# Patient Record
Sex: FEMALE | Race: WHITE | Employment: FULL TIME | URBAN - METROPOLITAN AREA
[De-identification: names, ages, dates, MRNs, and addresses within clinical notes are randomized per-mention and may not be internally consistent; named-entity substitution may affect disease eponyms.]

---

## 2017-05-24 ENCOUNTER — ANESTHESIA (OUTPATIENT)
Dept: OPERATING ROOM | Age: 42
End: 2017-05-24
Payer: COMMERCIAL

## 2017-05-24 ENCOUNTER — ANESTHESIA EVENT (OUTPATIENT)
Dept: OPERATING ROOM | Age: 42
End: 2017-05-24
Payer: COMMERCIAL

## 2017-05-24 ENCOUNTER — HOSPITAL ENCOUNTER (OUTPATIENT)
Age: 42
Setting detail: OUTPATIENT SURGERY
Discharge: HOME | End: 2017-05-24
Attending: UROLOGY | Admitting: UROLOGY
Payer: COMMERCIAL

## 2017-05-24 ENCOUNTER — APPOINTMENT (OUTPATIENT)
Dept: GENERAL RADIOLOGY | Age: 42
End: 2017-05-24
Attending: UROLOGY
Payer: COMMERCIAL

## 2017-05-24 VITALS
OXYGEN SATURATION: 98 % | HEART RATE: 93 BPM | HEIGHT: 65 IN | DIASTOLIC BLOOD PRESSURE: 80 MMHG | SYSTOLIC BLOOD PRESSURE: 130 MMHG | RESPIRATION RATE: 19 BRPM | WEIGHT: 223 LBS | TEMPERATURE: 97.5 F | BODY MASS INDEX: 37.15 KG/M2

## 2017-05-24 VITALS — DIASTOLIC BLOOD PRESSURE: 52 MMHG | SYSTOLIC BLOOD PRESSURE: 99 MMHG | OXYGEN SATURATION: 99 %

## 2017-05-24 LAB — HCG, PREGNANCY URINE (POC): NEGATIVE

## 2017-05-24 PROCEDURE — 84703 CHORIONIC GONADOTROPIN ASSAY: CPT

## 2017-05-24 PROCEDURE — 2500000003 HC RX 250 WO HCPCS: Performed by: NURSE ANESTHETIST, CERTIFIED REGISTERED

## 2017-05-24 PROCEDURE — 3600000014 HC SURGERY LEVEL 4 ADDTL 15MIN: Performed by: UROLOGY

## 2017-05-24 PROCEDURE — 7100000010 HC PHASE II RECOVERY - FIRST 15 MIN: Performed by: UROLOGY

## 2017-05-24 PROCEDURE — 7100000000 HC PACU RECOVERY - FIRST 15 MIN: Performed by: UROLOGY

## 2017-05-24 PROCEDURE — 2580000003 HC RX 258: Performed by: UROLOGY

## 2017-05-24 PROCEDURE — 6360000002 HC RX W HCPCS: Performed by: UROLOGY

## 2017-05-24 PROCEDURE — 3700000001 HC ADD 15 MINUTES (ANESTHESIA): Performed by: UROLOGY

## 2017-05-24 PROCEDURE — C1769 GUIDE WIRE: HCPCS | Performed by: UROLOGY

## 2017-05-24 PROCEDURE — 3700000000 HC ANESTHESIA ATTENDED CARE: Performed by: UROLOGY

## 2017-05-24 PROCEDURE — 6360000004 HC RX CONTRAST MEDICATION

## 2017-05-24 PROCEDURE — C2617 STENT, NON-COR, TEM W/O DEL: HCPCS | Performed by: UROLOGY

## 2017-05-24 PROCEDURE — 3600000004 HC SURGERY LEVEL 4 BASE: Performed by: UROLOGY

## 2017-05-24 PROCEDURE — 7100000001 HC PACU RECOVERY - ADDTL 15 MIN: Performed by: UROLOGY

## 2017-05-24 PROCEDURE — 87086 URINE CULTURE/COLONY COUNT: CPT

## 2017-05-24 PROCEDURE — 74000 XR ABDOMEN LIMITED (KUB): CPT

## 2017-05-24 PROCEDURE — C1758 CATHETER, URETERAL: HCPCS | Performed by: UROLOGY

## 2017-05-24 PROCEDURE — 2580000003 HC RX 258: Performed by: ANESTHESIOLOGY

## 2017-05-24 PROCEDURE — 6370000000 HC RX 637 (ALT 250 FOR IP): Performed by: UROLOGY

## 2017-05-24 PROCEDURE — 6360000002 HC RX W HCPCS: Performed by: ANESTHESIOLOGY

## 2017-05-24 PROCEDURE — 6360000002 HC RX W HCPCS: Performed by: NURSE ANESTHETIST, CERTIFIED REGISTERED

## 2017-05-24 DEVICE — URETERAL STENT
Type: IMPLANTABLE DEVICE | Site: URETER | Status: FUNCTIONAL
Brand: POLARIS™ ULTRA

## 2017-05-24 RX ORDER — DOCUSATE SODIUM 100 MG/1
100 CAPSULE, LIQUID FILLED ORAL 2 TIMES DAILY PRN
Qty: 30 CAPSULE | Refills: 1 | Status: SHIPPED | OUTPATIENT
Start: 2017-05-24 | End: 2017-10-02 | Stop reason: ALTCHOICE

## 2017-05-24 RX ORDER — ONDANSETRON 2 MG/ML
INJECTION INTRAMUSCULAR; INTRAVENOUS PRN
Status: DISCONTINUED | OUTPATIENT
Start: 2017-05-24 | End: 2017-05-24 | Stop reason: SDUPTHER

## 2017-05-24 RX ORDER — FENTANYL CITRATE 50 UG/ML
25 INJECTION, SOLUTION INTRAMUSCULAR; INTRAVENOUS EVERY 5 MIN PRN
Status: COMPLETED | OUTPATIENT
Start: 2017-05-24 | End: 2017-05-24

## 2017-05-24 RX ORDER — LIDOCAINE HYDROCHLORIDE 10 MG/ML
INJECTION, SOLUTION EPIDURAL; INFILTRATION; INTRACAUDAL; PERINEURAL PRN
Status: DISCONTINUED | OUTPATIENT
Start: 2017-05-24 | End: 2017-05-24 | Stop reason: SDUPTHER

## 2017-05-24 RX ORDER — MIDAZOLAM HYDROCHLORIDE 1 MG/ML
2 INJECTION INTRAMUSCULAR; INTRAVENOUS ONCE
Status: COMPLETED | OUTPATIENT
Start: 2017-05-24 | End: 2017-05-24

## 2017-05-24 RX ORDER — MAGNESIUM HYDROXIDE 1200 MG/15ML
LIQUID ORAL CONTINUOUS PRN
Status: DISCONTINUED | OUTPATIENT
Start: 2017-05-24 | End: 2017-05-24 | Stop reason: HOSPADM

## 2017-05-24 RX ORDER — GLYCOPYRROLATE 0.2 MG/ML
INJECTION INTRAMUSCULAR; INTRAVENOUS PRN
Status: DISCONTINUED | OUTPATIENT
Start: 2017-05-24 | End: 2017-05-24 | Stop reason: SDUPTHER

## 2017-05-24 RX ORDER — FENTANYL CITRATE 50 UG/ML
INJECTION, SOLUTION INTRAMUSCULAR; INTRAVENOUS PRN
Status: DISCONTINUED | OUTPATIENT
Start: 2017-05-24 | End: 2017-05-24 | Stop reason: SDUPTHER

## 2017-05-24 RX ORDER — ONDANSETRON 2 MG/ML
4 INJECTION INTRAMUSCULAR; INTRAVENOUS ONCE
Status: COMPLETED | OUTPATIENT
Start: 2017-05-24 | End: 2017-05-24

## 2017-05-24 RX ORDER — HYDROCODONE BITARTRATE AND ACETAMINOPHEN 5; 325 MG/1; MG/1
2 TABLET ORAL EVERY 6 HOURS PRN
COMMUNITY
End: 2017-10-02 | Stop reason: ALTCHOICE

## 2017-05-24 RX ORDER — SODIUM CHLORIDE, SODIUM LACTATE, POTASSIUM CHLORIDE, CALCIUM CHLORIDE 600; 310; 30; 20 MG/100ML; MG/100ML; MG/100ML; MG/100ML
INJECTION, SOLUTION INTRAVENOUS CONTINUOUS
Status: DISCONTINUED | OUTPATIENT
Start: 2017-05-24 | End: 2017-05-24 | Stop reason: HOSPADM

## 2017-05-24 RX ORDER — KETOROLAC TROMETHAMINE 30 MG/ML
INJECTION, SOLUTION INTRAMUSCULAR; INTRAVENOUS PRN
Status: DISCONTINUED | OUTPATIENT
Start: 2017-05-24 | End: 2017-05-24 | Stop reason: SDUPTHER

## 2017-05-24 RX ORDER — OXYBUTYNIN CHLORIDE 5 MG/1
5 TABLET ORAL 3 TIMES DAILY PRN
Qty: 30 TABLET | Refills: 1 | Status: SHIPPED | OUTPATIENT
Start: 2017-05-24 | End: 2017-10-02 | Stop reason: ALTCHOICE

## 2017-05-24 RX ORDER — ULTRASOUND COUPLING MEDIUM
GEL (GRAM) TOPICAL PRN
Status: DISCONTINUED | OUTPATIENT
Start: 2017-05-24 | End: 2017-05-24 | Stop reason: HOSPADM

## 2017-05-24 RX ORDER — DEXAMETHASONE SODIUM PHOSPHATE 10 MG/ML
INJECTION INTRAMUSCULAR; INTRAVENOUS PRN
Status: DISCONTINUED | OUTPATIENT
Start: 2017-05-24 | End: 2017-05-24 | Stop reason: SDUPTHER

## 2017-05-24 RX ORDER — TAMSULOSIN HYDROCHLORIDE 0.4 MG/1
0.4 CAPSULE ORAL DAILY
Qty: 7 CAPSULE | Refills: 1 | Status: SHIPPED | OUTPATIENT
Start: 2017-05-24 | End: 2017-10-02 | Stop reason: ALTCHOICE

## 2017-05-24 RX ORDER — PROPOFOL 10 MG/ML
INJECTION, EMULSION INTRAVENOUS PRN
Status: DISCONTINUED | OUTPATIENT
Start: 2017-05-24 | End: 2017-05-24 | Stop reason: SDUPTHER

## 2017-05-24 RX ORDER — OXYCODONE HYDROCHLORIDE AND ACETAMINOPHEN 5; 325 MG/1; MG/1
1-2 TABLET ORAL EVERY 4 HOURS PRN
Qty: 30 TABLET | Refills: 0 | Status: SHIPPED | OUTPATIENT
Start: 2017-05-24 | End: 2017-10-02 | Stop reason: ALTCHOICE

## 2017-05-24 RX ADMIN — PHENYLEPHRINE HYDROCHLORIDE 100 MCG: 10 INJECTION INTRAMUSCULAR; INTRAVENOUS; SUBCUTANEOUS at 16:52

## 2017-05-24 RX ADMIN — Medication 2 G: at 16:30

## 2017-05-24 RX ADMIN — FENTANYL CITRATE 25 MCG: 50 INJECTION, SOLUTION INTRAMUSCULAR; INTRAVENOUS at 15:53

## 2017-05-24 RX ADMIN — DEXAMETHASONE SODIUM PHOSPHATE 10 MG: 10 INJECTION INTRAMUSCULAR; INTRAVENOUS at 16:30

## 2017-05-24 RX ADMIN — LIDOCAINE HYDROCHLORIDE 50 MG: 10 INJECTION, SOLUTION EPIDURAL; INFILTRATION; INTRACAUDAL; PERINEURAL at 16:26

## 2017-05-24 RX ADMIN — SODIUM CHLORIDE, POTASSIUM CHLORIDE, SODIUM LACTATE AND CALCIUM CHLORIDE: 600; 310; 30; 20 INJECTION, SOLUTION INTRAVENOUS at 14:19

## 2017-05-24 RX ADMIN — GLYCOPYRROLATE 0.2 MG: 0.2 INJECTION INTRAMUSCULAR; INTRAVENOUS at 16:44

## 2017-05-24 RX ADMIN — KETOROLAC TROMETHAMINE 30 MG: 30 INJECTION, SOLUTION INTRAMUSCULAR; INTRAVENOUS at 16:59

## 2017-05-24 RX ADMIN — ONDANSETRON 4 MG: 2 INJECTION, SOLUTION INTRAMUSCULAR; INTRAVENOUS at 16:06

## 2017-05-24 RX ADMIN — FENTANYL CITRATE 50 MCG: 50 INJECTION INTRAMUSCULAR; INTRAVENOUS at 16:26

## 2017-05-24 RX ADMIN — FENTANYL CITRATE 25 MCG: 50 INJECTION, SOLUTION INTRAMUSCULAR; INTRAVENOUS at 16:12

## 2017-05-24 RX ADMIN — ONDANSETRON 4 MG: 2 INJECTION, SOLUTION INTRAMUSCULAR; INTRAVENOUS at 16:30

## 2017-05-24 RX ADMIN — FENTANYL CITRATE 25 MCG: 50 INJECTION, SOLUTION INTRAMUSCULAR; INTRAVENOUS at 16:07

## 2017-05-24 RX ADMIN — GLYCOPYRROLATE 0.2 MG: 0.2 INJECTION INTRAMUSCULAR; INTRAVENOUS at 16:42

## 2017-05-24 RX ADMIN — FENTANYL CITRATE 25 MCG: 50 INJECTION, SOLUTION INTRAMUSCULAR; INTRAVENOUS at 15:58

## 2017-05-24 RX ADMIN — MIDAZOLAM HYDROCHLORIDE 2 MG: 1 INJECTION, SOLUTION INTRAMUSCULAR; INTRAVENOUS at 14:27

## 2017-05-24 RX ADMIN — PROPOFOL 200 MG: 10 INJECTION, EMULSION INTRAVENOUS at 16:26

## 2017-05-24 ASSESSMENT — PAIN SCALES - GENERAL
PAINLEVEL_OUTOF10: 0
PAINLEVEL_OUTOF10: 7
PAINLEVEL_OUTOF10: 0
PAINLEVEL_OUTOF10: 0

## 2017-05-24 ASSESSMENT — ENCOUNTER SYMPTOMS: SHORTNESS OF BREATH: 0

## 2017-05-24 ASSESSMENT — PAIN - FUNCTIONAL ASSESSMENT: PAIN_FUNCTIONAL_ASSESSMENT: 0-10

## 2017-05-25 LAB
CULTURE: NO GROWTH
CULTURE: NORMAL
Lab: NORMAL
SPECIMEN DESCRIPTION: NORMAL
STATUS: NORMAL

## 2017-09-09 DIAGNOSIS — I10 UNSPECIFIED ESSENTIAL HYPERTENSION: ICD-10-CM

## 2017-10-02 ENCOUNTER — HOSPITAL ENCOUNTER (OUTPATIENT)
Age: 42
Setting detail: SPECIMEN
Discharge: HOME | End: 2017-10-02
Payer: COMMERCIAL

## 2017-10-02 DIAGNOSIS — Z83.2 FAMILY HISTORY OF FACTOR V LEIDEN MUTATION: ICD-10-CM

## 2017-10-03 LAB
FACTOR V ACTIVITY: 118 % (ref 50–150)
PROTEIN C ANTIGEN: 108 % (ref 63–153)
PROTEIN S ANTIGEN, TOTAL: 139 % (ref 63–126)

## 2017-10-05 LAB
ANTICARDIOLIPIN IGA ANTIBODY: 0.5 APU
ANTICARDIOLIPIN IGG ANTIBODY: 14.8 GPU
CARDIOLIPIN AB IGM: 6 MPU
DILUTE RUSSELL VIPER VENOM TIME: NORMAL
FACTOR V LEIDEN MUTATION: NORMAL
INR BLD: 0.9
LUPUS ANTICOAG: NORMAL
MTHFR MUTATION 677T/A1298C: NORMAL
PARTIAL THROMBOPLASTIN TIME: 24 SEC (ref 21.3–31.3)
PROTHROMBIN TIME: 9.7 SEC (ref 9.4–12.6)

## 2017-10-18 PROBLEM — Z15.89 HOMOZYGOUS MTHFR MUTATION A1298C: Status: ACTIVE | Noted: 2017-10-18

## 2017-10-23 ENCOUNTER — OFFICE VISIT (OUTPATIENT)
Dept: ORTHOPEDIC SURGERY | Age: 42
End: 2017-10-23
Payer: COMMERCIAL

## 2017-10-23 VITALS — BODY MASS INDEX: 37.15 KG/M2 | HEIGHT: 65 IN | WEIGHT: 223 LBS

## 2017-10-23 DIAGNOSIS — M79.631 RIGHT FOREARM PAIN: Primary | ICD-10-CM

## 2017-10-23 DIAGNOSIS — M25.562 LEFT KNEE PAIN, UNSPECIFIED CHRONICITY: ICD-10-CM

## 2017-10-23 PROBLEM — M79.602 LEFT ARM PAIN: Status: ACTIVE | Noted: 2017-10-23

## 2017-10-23 PROCEDURE — 99203 OFFICE O/P NEW LOW 30 MIN: CPT | Performed by: ORTHOPAEDIC SURGERY

## 2017-10-23 NOTE — PROGRESS NOTES
Subjective:      Patient ID: Yolanda Parra is a 43 y.o. female.     HPI    Review of Systems    Objective:   Physical Exam  X-rays left knee reviewed standing AP bilateral lateral left normal for age  Assessment:            Plan:
slight bit ecchymosis at the junction middle distal third of the forearm she has some slight tenderness to palpation over the volar ulnar junction middle proximal forearm FDS FDP and wrist flexors all appear to be intact    Examination left leg reveals trace if any edema really don't see much at this time at all. Patient normal strength no obvious deformity knee exam is normal  Assessment:      Encounter Diagnosis   Name Primary?     Left forearm pain Yes   Patient with likely proximal muscle pull  Knee exam is unremarkable    Patient with aggravation with using a treadmill    Recommend patient readjust her workout schedule as it appears it slightly and overuse injury      Plan:      Patient reassured    Follow-up as needed

## 2018-01-17 ENCOUNTER — OFFICE VISIT (OUTPATIENT)
Dept: OBGYN CLINIC | Age: 43
End: 2018-01-17
Payer: COMMERCIAL

## 2018-01-17 ENCOUNTER — HOSPITAL ENCOUNTER (OUTPATIENT)
Age: 43
Setting detail: SPECIMEN
Discharge: HOME | End: 2018-01-17
Payer: COMMERCIAL

## 2018-01-17 VITALS
RESPIRATION RATE: 18 BRPM | HEART RATE: 82 BPM | DIASTOLIC BLOOD PRESSURE: 86 MMHG | HEIGHT: 65 IN | BODY MASS INDEX: 37.99 KG/M2 | SYSTOLIC BLOOD PRESSURE: 150 MMHG | WEIGHT: 228 LBS

## 2018-01-17 DIAGNOSIS — Z01.411 ENCOUNTER FOR GYNECOLOGICAL EXAMINATION (GENERAL) (ROUTINE) WITH ABNORMAL FINDINGS: ICD-10-CM

## 2018-01-17 DIAGNOSIS — N92.1 MENORRHAGIA WITH IRREGULAR CYCLE: ICD-10-CM

## 2018-01-17 DIAGNOSIS — Z11.51 SPECIAL SCREENING EXAMINATION FOR HUMAN PAPILLOMAVIRUS (HPV): ICD-10-CM

## 2018-01-17 DIAGNOSIS — Z01.419 WELL FEMALE EXAM WITH ROUTINE GYNECOLOGICAL EXAM: Primary | ICD-10-CM

## 2018-01-17 PROCEDURE — 99396 PREV VISIT EST AGE 40-64: CPT | Performed by: ADVANCED PRACTICE MIDWIFE

## 2018-01-17 PROCEDURE — G0145 SCR C/V CYTO,THINLAYER,RESCR: HCPCS

## 2018-01-17 PROCEDURE — 87624 HPV HI-RISK TYP POOLED RSLT: CPT

## 2018-01-17 ASSESSMENT — PATIENT HEALTH QUESTIONNAIRE - PHQ9
SUM OF ALL RESPONSES TO PHQ9 QUESTIONS 1 & 2: 0
SUM OF ALL RESPONSES TO PHQ QUESTIONS 1-9: 0
1. LITTLE INTEREST OR PLEASURE IN DOING THINGS: 0
2. FEELING DOWN, DEPRESSED OR HOPELESS: 0

## 2018-01-17 NOTE — PROGRESS NOTES
ureteroscopy, stent, holmium laser    FOOT SURGERY Right 2010    HEMORRHOID SURGERY  2015    removal    TONSILLECTOMY       Family History   Problem Relation Age of Onset    Diabetes Father     High Blood Pressure Father     Heart Disease Father     Diabetes Sister     Diabetes Brother     Diabetes Daughter      Social History     Social History    Marital status: Single     Spouse name: N/A    Number of children: N/A    Years of education: N/A     Occupational History    Not on file. Social History Main Topics    Smoking status: Current Every Day Smoker     Types: Cigarettes    Smokeless tobacco: Never Used    Alcohol use Yes      Comment: occasssional    Drug use: No    Sexual activity: Yes     Partners: Male     Other Topics Concern    Not on file     Social History Narrative    No narrative on file       MEDICATIONS:  Current Outpatient Prescriptions   Medication Sig Dispense Refill    metoprolol tartrate (LOPRESSOR) 25 MG tablet take 2 tablets by mouth in the morning and 1 tablet in the evening 270 tablet 3    liraglutide-weight management (SAXENDA) 18 MG/3ML SOPN Inject 3mg daily 1 Pen 5    SUMAtriptan (IMITREX) 100 MG tablet Take 1 tablet by mouth once as needed for Migraine 9 tablet 3     No current facility-administered medications for this visit. ALLERGIES:  Allergies as of 01/17/2018 - Review Complete 01/17/2018   Allergen Reaction Noted    Percocet [oxycodone-acetaminophen] Itching and Anxiety 10/02/2017       Symptoms of decreased mood absent    **If either question is answered in a  positive fashion then complete the PHQ9 Scoring Evaluation and make the appropriate referral**      Immunization status: up to date and documented, stated as current, but no records available. Gynecologic History:  Menarche: 15 yo  Menopause at  yo     Patient's last menstrual period was 12/26/2017.     Sexually Active: Yes    STD History: No     Permanent Sterilization: No Tobacco & Secondary smoke risks reviewed; instructed on cessation and avoidance      Counseling Completed:  Preventative Health Recommendations and Follow up. The patient was informed of the recommended preventative health recommendations. 1. Annuals every year; Cytology collections per prevailing guidelines. 2. Mammograms begin every year at 35 yo if no abnormalities are found and no family     History. 3. Bone density studies every 2-3 years. Begin at 71 yo. If no fracture history or osteoporosis family history. (significant). 4. Colonoscopy begin at 49 yo. Repeat every ten years if negative and no family history. 5. Calcium of 2322-6379 mg/day in split dosing  6. Vitamin D 400-800 IU/day  7. All other preventative health recommendations will be managed by the patients Primary care physician. PLAN:  Return in about 3 weeks (around 2/7/2018) for Dr Nehemiah Kang to discuss possible ablation . Pelvic ultrasound ordered, CBC, TSH with reflex, Quant BHCG, PT, PTT ordered call for results  FSH, estradiol   Written information on ablation given  Repeat Annual every 1 year  Cervical Cytology Evaluation begins at 24years old. If Negative Cytology, Follow-up screening per current guidelines. Mammograms every 1 year. If 35 yo and last mammogram was negative. Calcium and Vitamin D dosing reviewed. Colonoscopy screening reviewed as well as onset for bone density testing. Birth control and barrier recommendations discussed. STD counseling and prevention reviewed. Gardisil counseling completed for all patients 7-35 yo. Routine health maintenance per patients PCP. Orders Placed This Encounter   Procedures    US Non OB Transvaginal     Begin with transabdominal imaging.      Standing Status:   Future     Standing Expiration Date:   1/17/2019     Order Specific Question:   Reason for exam:     Answer:   abnormal bleeding    PAP SMEAR     Patient History:    Patient's last menstrual period was

## 2018-01-18 ENCOUNTER — HOSPITAL ENCOUNTER (OUTPATIENT)
Dept: ULTRASOUND IMAGING | Age: 43
Discharge: HOME | End: 2018-01-18
Payer: COMMERCIAL

## 2018-01-18 DIAGNOSIS — N92.1 MENORRHAGIA WITH IRREGULAR CYCLE: ICD-10-CM

## 2018-01-18 PROCEDURE — 76856 US EXAM PELVIC COMPLETE: CPT

## 2018-01-18 PROCEDURE — 76830 TRANSVAGINAL US NON-OB: CPT

## 2018-01-19 ENCOUNTER — HOSPITAL ENCOUNTER (OUTPATIENT)
Age: 43
Discharge: HOME | End: 2018-01-19
Payer: COMMERCIAL

## 2018-01-19 DIAGNOSIS — N92.1 MENORRHAGIA WITH IRREGULAR CYCLE: ICD-10-CM

## 2018-01-19 LAB
ABSOLUTE EOS #: 0.1 K/UL (ref 0–0.4)
ABSOLUTE IMMATURE GRANULOCYTE: ABNORMAL K/UL (ref 0–0.3)
ABSOLUTE LYMPH #: 2.8 K/UL (ref 1–4.8)
ABSOLUTE MONO #: 0.5 K/UL (ref 0.1–1.3)
BASOPHILS # BLD: 1 % (ref 0–2)
BASOPHILS ABSOLUTE: 0 K/UL (ref 0–0.2)
DIFFERENTIAL TYPE: ABNORMAL
EOSINOPHILS RELATIVE PERCENT: 1 % (ref 0–4)
ESTRADIOL LEVEL: 81 PG/ML (ref 27–314)
FOLLICLE STIMULATING HORMONE: 2 U/L (ref 1.7–21.5)
HCG QUANTITATIVE: <1 IU/L
HCT VFR BLD CALC: 44.9 % (ref 36–46)
HEMOGLOBIN: 14.6 G/DL (ref 12–16)
HPV SAMPLE: NORMAL
HPV SOURCE: NORMAL
HPV, GENOTYPE 16: NOT DETECTED
HPV, GENOTYPE 18: NOT DETECTED
HPV, HIGH RISK OTHER: NOT DETECTED
HPV, INTERPRETATION: NORMAL
IMMATURE GRANULOCYTES: ABNORMAL %
INR BLD: 1
LYMPHOCYTES # BLD: 36 % (ref 24–44)
MCH RBC QN AUTO: 28 PG (ref 26–34)
MCHC RBC AUTO-ENTMCNC: 32.5 G/DL (ref 31–37)
MCV RBC AUTO: 86.3 FL (ref 80–100)
MONOCYTES # BLD: 7 % (ref 1–7)
NRBC AUTOMATED: ABNORMAL PER 100 WBC
PARTIAL THROMBOPLASTIN TIME: 27.5 SEC (ref 23–31)
PDW BLD-RTO: 13.5 % (ref 11.5–14.9)
PLATELET # BLD: 258 K/UL (ref 150–450)
PLATELET ESTIMATE: ABNORMAL
PMV BLD AUTO: 9.1 FL (ref 6–12)
PROTHROMBIN TIME: 10 SEC (ref 9.7–12)
RBC # BLD: 5.21 M/UL (ref 4–5.2)
RBC # BLD: ABNORMAL 10*6/UL
SEG NEUTROPHILS: 55 % (ref 36–66)
SEGMENTED NEUTROPHILS ABSOLUTE COUNT: 4.3 K/UL (ref 1.3–9.1)
TSH SERPL DL<=0.05 MIU/L-ACNC: 0.83 MIU/L (ref 0.3–5)
WBC # BLD: 7.7 K/UL (ref 3.5–11)
WBC # BLD: ABNORMAL 10*3/UL

## 2018-01-19 PROCEDURE — 84702 CHORIONIC GONADOTROPIN TEST: CPT

## 2018-01-19 PROCEDURE — 36415 COLL VENOUS BLD VENIPUNCTURE: CPT

## 2018-01-19 PROCEDURE — 82670 ASSAY OF TOTAL ESTRADIOL: CPT

## 2018-01-19 PROCEDURE — 84443 ASSAY THYROID STIM HORMONE: CPT

## 2018-01-19 PROCEDURE — 85610 PROTHROMBIN TIME: CPT

## 2018-01-19 PROCEDURE — 85025 COMPLETE CBC W/AUTO DIFF WBC: CPT

## 2018-01-19 PROCEDURE — 83001 ASSAY OF GONADOTROPIN (FSH): CPT

## 2018-01-19 PROCEDURE — 85730 THROMBOPLASTIN TIME PARTIAL: CPT

## 2018-01-26 LAB — CYTOLOGY REPORT: NORMAL

## 2018-04-17 ENCOUNTER — OFFICE VISIT (OUTPATIENT)
Dept: OBGYN CLINIC | Age: 43
End: 2018-04-17
Payer: COMMERCIAL

## 2018-04-17 VITALS
HEIGHT: 65 IN | DIASTOLIC BLOOD PRESSURE: 72 MMHG | BODY MASS INDEX: 35.82 KG/M2 | SYSTOLIC BLOOD PRESSURE: 122 MMHG | WEIGHT: 215 LBS | HEART RATE: 78 BPM

## 2018-04-17 DIAGNOSIS — N94.6 DYSMENORRHEA: ICD-10-CM

## 2018-04-17 DIAGNOSIS — N92.1 MENORRHAGIA WITH IRREGULAR CYCLE: Primary | ICD-10-CM

## 2018-04-17 PROCEDURE — 99213 OFFICE O/P EST LOW 20 MIN: CPT | Performed by: OBSTETRICS & GYNECOLOGY

## 2018-05-02 ENCOUNTER — TELEPHONE (OUTPATIENT)
Dept: OBGYN CLINIC | Age: 43
End: 2018-05-02

## 2018-10-15 PROBLEM — R05.3 CHRONIC COUGH: Status: ACTIVE | Noted: 2018-10-15

## 2018-11-19 ENCOUNTER — HOSPITAL ENCOUNTER (OUTPATIENT)
Dept: SLEEP CENTER | Age: 43
Discharge: HOME | End: 2018-11-21
Payer: COMMERCIAL

## 2018-11-19 DIAGNOSIS — R53.82 CHRONIC FATIGUE: ICD-10-CM

## 2018-11-19 PROCEDURE — 95810 POLYSOM 6/> YRS 4/> PARAM: CPT

## 2018-11-20 VITALS
HEIGHT: 65 IN | RESPIRATION RATE: 16 BRPM | OXYGEN SATURATION: 96 % | DIASTOLIC BLOOD PRESSURE: 88 MMHG | BODY MASS INDEX: 39.65 KG/M2 | WEIGHT: 238 LBS | SYSTOLIC BLOOD PRESSURE: 136 MMHG | HEART RATE: 67 BPM

## 2018-11-20 ASSESSMENT — SLEEP AND FATIGUE QUESTIONNAIRES
HOW LIKELY ARE YOU TO NOD OFF OR FALL ASLEEP WHILE SITTING AND TALKING TO SOMEONE: 0
HOW LIKELY ARE YOU TO NOD OFF OR FALL ASLEEP WHEN YOU ARE A PASSENGER IN A CAR FOR AN HOUR WITHOUT A BREAK: 3
HOW LIKELY ARE YOU TO NOD OFF OR FALL ASLEEP WHILE WATCHING TV: 3
HOW LIKELY ARE YOU TO NOD OFF OR FALL ASLEEP WHEN YOU ARE A PASSENGER IN A CAR FOR AN HOUR WITHOUT A BREAK: 3
HOW LIKELY ARE YOU TO NOD OFF OR FALL ASLEEP WHILE SITTING QUIETLY AFTER LUNCH WITHOUT ALCOHOL: 3
HOW LIKELY ARE YOU TO NOD OFF OR FALL ASLEEP WHILE SITTING QUIETLY AFTER LUNCH WITHOUT ALCOHOL: 3
HOW LIKELY ARE YOU TO NOD OFF OR FALL ASLEEP WHILE SITTING AND READING: 3
HOW LIKELY ARE YOU TO NOD OFF OR FALL ASLEEP WHILE SITTING INACTIVE IN A PUBLIC PLACE: 3
HOW LIKELY ARE YOU TO NOD OFF OR FALL ASLEEP IN A CAR, WHILE STOPPED FOR A FEW MINUTES IN TRAFFIC: 0
HOW LIKELY ARE YOU TO NOD OFF OR FALL ASLEEP WHILE SITTING AND TALKING TO SOMEONE: 0
ESS TOTAL SCORE: 18
HOW LIKELY ARE YOU TO NOD OFF OR FALL ASLEEP IN A CAR, WHILE STOPPED FOR A FEW MINUTES IN TRAFFIC: 0
HOW LIKELY ARE YOU TO NOD OFF OR FALL ASLEEP WHILE LYING DOWN TO REST IN THE AFTERNOON WHEN CIRCUMSTANCES PERMIT: 3
HOW LIKELY ARE YOU TO NOD OFF OR FALL ASLEEP WHILE SITTING AND READING: 3
ESS TOTAL SCORE: 18
HOW LIKELY ARE YOU TO NOD OFF OR FALL ASLEEP WHILE WATCHING TV: 3
HOW LIKELY ARE YOU TO NOD OFF OR FALL ASLEEP WHILE LYING DOWN TO REST IN THE AFTERNOON WHEN CIRCUMSTANCES PERMIT: 3
HOW LIKELY ARE YOU TO NOD OFF OR FALL ASLEEP WHILE SITTING INACTIVE IN A PUBLIC PLACE: 3

## 2018-12-01 LAB — STATUS: NORMAL

## 2019-02-08 ENCOUNTER — OFFICE VISIT (OUTPATIENT)
Dept: OBGYN CLINIC | Age: 44
End: 2019-02-08
Payer: COMMERCIAL

## 2019-02-08 ENCOUNTER — HOSPITAL ENCOUNTER (OUTPATIENT)
Age: 44
Setting detail: SPECIMEN
Discharge: HOME | End: 2019-02-08
Payer: COMMERCIAL

## 2019-02-08 VITALS
BODY MASS INDEX: 38.65 KG/M2 | HEIGHT: 65 IN | HEART RATE: 78 BPM | WEIGHT: 232 LBS | SYSTOLIC BLOOD PRESSURE: 122 MMHG | DIASTOLIC BLOOD PRESSURE: 84 MMHG

## 2019-02-08 DIAGNOSIS — Z01.419 PAP SMEAR, AS PART OF ROUTINE GYNECOLOGICAL EXAMINATION: Primary | ICD-10-CM

## 2019-02-08 DIAGNOSIS — Z11.51 SCREENING FOR HUMAN PAPILLOMAVIRUS: ICD-10-CM

## 2019-02-08 DIAGNOSIS — N94.6 DYSMENORRHEA: ICD-10-CM

## 2019-02-08 DIAGNOSIS — Z98.890 HX OF ABDOMINOPLASTY: ICD-10-CM

## 2019-02-08 DIAGNOSIS — N92.0 MENORRHAGIA WITH REGULAR CYCLE: ICD-10-CM

## 2019-02-08 DIAGNOSIS — Z01.419 PAP SMEAR, AS PART OF ROUTINE GYNECOLOGICAL EXAMINATION: ICD-10-CM

## 2019-02-08 PROCEDURE — 87624 HPV HI-RISK TYP POOLED RSLT: CPT

## 2019-02-08 PROCEDURE — 99396 PREV VISIT EST AGE 40-64: CPT | Performed by: NURSE PRACTITIONER

## 2019-02-08 PROCEDURE — G0145 SCR C/V CYTO,THINLAYER,RESCR: HCPCS

## 2019-02-08 ASSESSMENT — PATIENT HEALTH QUESTIONNAIRE - PHQ9
2. FEELING DOWN, DEPRESSED OR HOPELESS: 0
SUM OF ALL RESPONSES TO PHQ QUESTIONS 1-9: 0
1. LITTLE INTEREST OR PLEASURE IN DOING THINGS: 0
SUM OF ALL RESPONSES TO PHQ9 QUESTIONS 1 & 2: 0
SUM OF ALL RESPONSES TO PHQ QUESTIONS 1-9: 0

## 2019-02-11 LAB
COMMENT: NORMAL
HPV SAMPLE: ABNORMAL
HPV, GENOTYPE 16: NOT DETECTED
HPV, GENOTYPE 18: NOT DETECTED
HPV, HIGH RISK OTHER: DETECTED
HPV, INTERPRETATION: ABNORMAL
SPECIMEN DESCRIPTION: ABNORMAL

## 2019-02-13 ENCOUNTER — OFFICE VISIT (OUTPATIENT)
Dept: OBGYN CLINIC | Age: 44
End: 2019-02-13

## 2019-02-13 DIAGNOSIS — N94.6 DYSMENORRHEA: ICD-10-CM

## 2019-02-13 DIAGNOSIS — N92.0 MENORRHAGIA WITH REGULAR CYCLE: Primary | ICD-10-CM

## 2019-02-13 DIAGNOSIS — N92.0 MENORRHAGIA WITH REGULAR CYCLE: ICD-10-CM

## 2019-02-13 PROCEDURE — 76830 TRANSVAGINAL US NON-OB: CPT | Performed by: OBSTETRICS & GYNECOLOGY

## 2019-02-13 PROCEDURE — 76856 US EXAM PELVIC COMPLETE: CPT | Performed by: OBSTETRICS & GYNECOLOGY

## 2019-02-19 ENCOUNTER — TELEPHONE (OUTPATIENT)
Dept: OBGYN CLINIC | Age: 44
End: 2019-02-19

## 2019-02-22 ENCOUNTER — TELEPHONE (OUTPATIENT)
Dept: OBGYN CLINIC | Age: 44
End: 2019-02-22

## 2019-02-22 LAB — CYTOLOGY REPORT: NORMAL

## 2019-02-27 ENCOUNTER — PROCEDURE VISIT (OUTPATIENT)
Dept: OBGYN CLINIC | Age: 44
End: 2019-02-27
Payer: COMMERCIAL

## 2019-02-27 ENCOUNTER — HOSPITAL ENCOUNTER (OUTPATIENT)
Age: 44
Setting detail: SPECIMEN
Discharge: HOME | End: 2019-02-27
Payer: COMMERCIAL

## 2019-02-27 VITALS
HEIGHT: 65 IN | SYSTOLIC BLOOD PRESSURE: 128 MMHG | BODY MASS INDEX: 37.65 KG/M2 | RESPIRATION RATE: 18 BRPM | HEART RATE: 88 BPM | DIASTOLIC BLOOD PRESSURE: 84 MMHG | WEIGHT: 226 LBS

## 2019-02-27 DIAGNOSIS — R87.810 ASCUS WITH POSITIVE HIGH RISK HPV CERVICAL: Primary | ICD-10-CM

## 2019-02-27 DIAGNOSIS — N94.6 DYSMENORRHEA: ICD-10-CM

## 2019-02-27 DIAGNOSIS — N92.1 MENORRHAGIA WITH IRREGULAR CYCLE: ICD-10-CM

## 2019-02-27 DIAGNOSIS — Z32.02 NEGATIVE PREGNANCY TEST: ICD-10-CM

## 2019-02-27 DIAGNOSIS — Z83.3 FAMILY HISTORY OF DIABETES MELLITUS IN FATHER: ICD-10-CM

## 2019-02-27 DIAGNOSIS — N85.2 BULKY OR ENLARGED UTERUS: ICD-10-CM

## 2019-02-27 DIAGNOSIS — N92.0 MENORRHAGIA WITH REGULAR CYCLE: ICD-10-CM

## 2019-02-27 DIAGNOSIS — Z98.891 HX OF CESAREAN SECTION: ICD-10-CM

## 2019-02-27 DIAGNOSIS — R87.610 ASCUS WITH POSITIVE HIGH RISK HPV CERVICAL: Primary | ICD-10-CM

## 2019-02-27 LAB
CONTROL: NORMAL
PREGNANCY TEST URINE, POC: NEGATIVE

## 2019-02-27 PROCEDURE — 88305 TISSUE EXAM BY PATHOLOGIST: CPT

## 2019-02-27 PROCEDURE — 57454 BX/CURETT OF CERVIX W/SCOPE: CPT | Performed by: OBSTETRICS & GYNECOLOGY

## 2019-02-27 PROCEDURE — 81025 URINE PREGNANCY TEST: CPT | Performed by: OBSTETRICS & GYNECOLOGY

## 2019-03-03 LAB — SURGICAL PATHOLOGY REPORT: NORMAL

## 2019-04-11 ENCOUNTER — HOSPITAL ENCOUNTER (OUTPATIENT)
Age: 44
Setting detail: SPECIMEN
Discharge: HOME | End: 2019-04-11
Payer: COMMERCIAL

## 2019-04-11 ENCOUNTER — PROCEDURE VISIT (OUTPATIENT)
Dept: OBGYN CLINIC | Age: 44
End: 2019-04-11
Payer: COMMERCIAL

## 2019-04-11 VITALS
DIASTOLIC BLOOD PRESSURE: 90 MMHG | HEIGHT: 65 IN | SYSTOLIC BLOOD PRESSURE: 124 MMHG | HEART RATE: 78 BPM | BODY MASS INDEX: 37.65 KG/M2 | WEIGHT: 226 LBS

## 2019-04-11 DIAGNOSIS — Z32.02 NEGATIVE PREGNANCY TEST: ICD-10-CM

## 2019-04-11 DIAGNOSIS — N94.6 DYSMENORRHEA: Primary | ICD-10-CM

## 2019-04-11 DIAGNOSIS — N92.0 MENORRHAGIA WITH REGULAR CYCLE: ICD-10-CM

## 2019-04-11 DIAGNOSIS — N85.2 ENLARGED UTERUS: ICD-10-CM

## 2019-04-11 LAB
CONTROL: NORMAL
PREGNANCY TEST URINE, POC: NEGATIVE

## 2019-04-11 PROCEDURE — 88305 TISSUE EXAM BY PATHOLOGIST: CPT

## 2019-04-11 PROCEDURE — 81025 URINE PREGNANCY TEST: CPT | Performed by: OBSTETRICS & GYNECOLOGY

## 2019-04-11 PROCEDURE — 58558 HYSTEROSCOPY BIOPSY: CPT | Performed by: OBSTETRICS & GYNECOLOGY

## 2019-04-11 RX ORDER — BUPROPION HYDROCHLORIDE 150 MG/1
TABLET ORAL
Refills: 5 | COMMUNITY
Start: 2019-03-27 | End: 2019-09-26

## 2019-04-11 NOTE — PROGRESS NOTES
[facility]  Flexible-3mm  Hysteroscopy Procedure Note      Marge Tees  2019  Patient's last menstrual period was 2019. Chief Complaint   Patient presents with    Procedure   [id number] 1-c/S LTC and  x 2      Blood pressure (!) 124/90, pulse 78, height 5' 5\" (1.651 m), weight 226 lb (102.5 kg), last menstrual period 2019, not currently breastfeeding. UltraSound Findings:             UPT: negative  Cytology Report 2019  9:[address]   [address]   ANATOMIC PATHOLOGY   [address] [place] [address] Drive, [address]. [place], [address]   [phone]   Fax: [phone]   GYNECOLOGIC CYTOLOGY REPORT     Patient Name: Eduardo Sawant   MR#: [id number]   Specimen #[id number]   Source:   1: Cervical material, (ThinPrep vial, Imaging-assisted review)     Clinical History   Z01.419 Routine gyn exam without abnormal findings   Co-Test:  ThinPrep Pap with high risk HPV testing     INTERPRETATION     Cervical material, (ThinPrep vial, Imaging-assisted review):   Specimen Adequacy:        Satisfactory for evaluation.        -Endocervical/transformation zone component is absent. Descriptive Diagnosis:        Atypical squamous cells of undetermined significance (ASC-US).  Comments:        High Risk HPV testing was ordered. Cytotechnologist:   ANDREZ Donovan M.D.   **Electronically Signed Out**         sls/2019           Procedure/Addendum   HPV Procedure Report     Date Ordered:     2019     Status:   Signed Out        Date Complete:     2019     By: PABLITO Lora(ASCP)        Date Reported:     2019       INTERPRETATION   Roche HPV DNA High Risk                                                         HPV Sample               Thin Prep                    (Ref Range)   HPV Type 16               Not Detected                    (Not   Detected)   HPV Type 18               Not Detected                    (Not   Detected)   Other High Risk HPV     Detected                    (Not Detected)        This test amplifies and detects DNA of 14 high-risk HPV types   associated with cervical cancer and its precursor lesions (HPV types   16, 18, 31, 33, 35, 39, 45, 51, 52, 56, 58, 59, 66, and 68). Sensitivity may be affected by specimen collection methods, stage of   infection, and the presence of interfering substances.  Results should   be interpreted in conjunction with other available laboratory and   clinical data.  A negative high-risk HPV result does not exclude the   possibility of future cytologic HSIL or underlying CIN2-3 or cancer. This test is intended for medical purposes only and is not valid for   the evaluation of suspected sexual abuse or for other forensic   purposes.          Surgical Pathology Report 02/27/2019 12:41 PM [facility]   [address] [facility] [address] [place] [address]   [phone]   Fax: [phone]   SURGICAL PATHOLOGY REPORT     Patient Name: Jerald Martell   MR#: [id number]   Specimen #[id number]         Final Diagnosis   CERVIX, ENDOCERVICAL CURETTAGE:          FRAGMENTS OF ENDOCERVICAL MUCOSAL AND ECTOCERVICAL EPITHELIUM   WITH ACUTE AND CHRONIC INFLAMMATION AND REACTIVE EPITHELIAL CELL   CHANGES     NEGATIVE FOR DYSPLASIA OR HUMAN PAPILLOMA VIRUS CYTOPATHIC CHANGES     Diagnosis Comment   Colposcopy with biopsy is recommended if clinically indicated. Emy Painter M.D.   **Electronically Signed Out**         tc/3/3/2019     Clinical Information   ASCUS with positive high risk HPV cervical     Source:   A: Endocervical curettage     Gross Description   Received in formalin, in a container, labelled with the patient's   name, identifiers and \"ECC\" is an aggregate (2 x 1.5 x 0.2 cm) of tan   hemorrhagic mucinous material.  The specimen is wrapped in lens paper   and entirely submitted in one cassette. cervical lip. The uterus was sounded to 10 cm. An Endo See  was then placed thru the endocervical canal and into the endometrial cavity without any complications. A total of 30 ml of normal saline was instilled to aid in visualization of the endometrial anatomy, video was not completed. During the hysteroscopic procedure an endometrial sampling was performed without incident. Pathology is pending. The patient tolerated the procedure well, the Allis was removed off the anterior cervical lip and there was noted to be adequate hemostasis. The patient was given restrictions and will follow-up in the office in 10-14 days. She will report any abdominal pain, heavy vaginal bleeding or a temperature of greater than 100.4. Hysteroscopic Findings:  No septums polyps or fibroids    Assessment:   Diagnosis Orders   1. Dysmenorrhea  HI HYSTEROSCOPY,W/ENDO BX    Specimen to Pathology Outpatient   2. Menorrhagia with regular cycle  HI HYSTEROSCOPY,W/ENDO BX    Specimen to Pathology Outpatient   3. Enlarged uterus  HI HYSTEROSCOPY,W/ENDO BX    Specimen to Pathology Outpatient   4.  Negative pregnancy test  POCT urine pregnancy     Patient Active Problem List    Diagnosis Date Noted    Dysmenorrhea 2019     Priority: High    Menorrhagia with regular cycle 10/15/2018     Priority: High    Essential hypertension 2014     Priority: High    Hx of  section x 1 LTC 2019     Priority: Medium    Hx of abdominoplasty 2019     Priority: Medium    Smoker 10/15/2018     Priority: Medium    [id number]2017     Priority: Medium    Family history of factor V Leiden mutation (daughter) Pt is negative 10/02/2017     Priority: Medium    Chronic cough 10/15/2018    Chronic fatigue 10/15/2018    Microscopic hematuria 2018    Acute pain of right knee 10/23/2017    Right forearm pain 10/23/2017    History of migraine headaches     Mixed hyperlipidemia     Class 2 severe obesity due to excess calories with serious comorbidity and body mass index (BMI) of 39.0 to 39.9 in adult [facility]) 05/27/2015           Recommendations:  Return to the office for follow-up in 2 weeks to review the pathology of the biopsy and for further recommendations. Patient is considering Medically indicated tubal removal and endometrial ablation with hysteroscopy via Novasure . Pt counseling completed:  Risk reduction Counseling for Primary Peritoneal/Ovarian Cancer: The patient was counseled on the risk factors that make her above the average risk for development of primary peritoneal/ovarian cancer. The patient was also counseled on the options of completing Risk Reduction Surgery with her upcoming pelvic gynecologic or abdominal surgery. The procedure with its associated risks, benefits, and alternatives were reviewed at length. Per the the recommendations from the Society of Gynecologic Oncology and the [employer] of Obstetricians and Gynecologists, the patient had the procedure reviewed and was informed of the potential benefits of such a procedure. The primary benefit is a greater than 50% reduction in the future risk of development Primary Peritoneal/Ovarian cancer. She was informed that large scale studies have not shown an increase in the estimated blood loss, complications, or operating time. The patient was made aware that bleeding, infection, and injury to nearby organs are potential complications with this additional surgery. The patient was also informed and had this reviewed in detail with her that once the risk reduction procedure is completed she will have lost the fertility opportunity, to conceive naturally, going forward and that any future conception would require reproductive assisted approaches; (IVF, GIFT,ZIFT)-all described in lay terms to the patient. The patient was counseled and understands that the loss of fertility can not be reversed.   She voiced understanding of this and signed a written consent. She was also counseled that any future pregnancy (18- cases annually), carries an added increase risk of ectopic pregnancy and the potential need for future surgery. The patient still wishes to proceed with the risk reduction surgery. The patient had explained to her that the completion of the procedure does not guarantee her that she will not be diagnosed with a future primary peritoneal or ovarian malignancy but her risks are greatly reduced. The patient had the procedure discussed with her at length and in detail. The risks and benefits of concurrent ovarian cancer risk reducing bilateral salpingectomy , (Removal of the fallopian tubes), with the patient's upcoming scheduled endometrial ablation. The patient was counseled on the medically indicated need to restrict a future pregnancy after an endometrial ablation. She was counseled on the increased risk of having an accreta, increta, or percreta, (in Lay terms), with any future pregnancy and the risks each of those carry with them. She is aware after the counseling that if one of the previously listed situations occurred that there would be a high probability that she would need a blood transfusion and/or a   hysterectomy and even with those interventions maternal death is 50% with the percreta. The patient was offered a medically indicated tubal removal, Risk Reduction Procedure. This would entail removing of the fallopian tubes including the fimbriae. She was informed this would require three incisions that were small and where they would be located on her abdomen. She was also given the information of a tubal interruption, clipping or burning of the fallopian tubes. She was made aware that this requires two incisions that were the same size as the procedure to remove her fallopian tubes. She was counseled on the alternate location of those incisions.  She was given the information of Risk Reduction from future primary peritoneal Carcinoma being upwards of 65% with removal of the fallopian tubes and 45-50% with interruption of the fallopian tubes. Finally, the patient has been thoroughly counseled regarding the consequence of loss of fertility following this procedure. The patient understands that this loss of fertility cannot be reversed and has expressed via verbal and written consent that her wishes are to proceed with this surgery for the purposes of reducing risk for ovarian cancer. The patient is requesting to have her fallopian tubes Removed; triple incision procedure .         Ashwin Tom DO

## 2019-04-16 LAB — SURGICAL PATHOLOGY REPORT: NORMAL

## 2019-04-30 ENCOUNTER — OFFICE VISIT (OUTPATIENT)
Dept: OBGYN CLINIC | Age: 44
End: 2019-04-30
Payer: COMMERCIAL

## 2019-04-30 VITALS
BODY MASS INDEX: 37.99 KG/M2 | HEIGHT: 65 IN | SYSTOLIC BLOOD PRESSURE: 138 MMHG | DIASTOLIC BLOOD PRESSURE: 84 MMHG | HEART RATE: 80 BPM | WEIGHT: 228 LBS

## 2019-04-30 DIAGNOSIS — Z98.891 HX OF CESAREAN SECTION: ICD-10-CM

## 2019-04-30 DIAGNOSIS — N85.2 ENLARGED UTERUS: ICD-10-CM

## 2019-04-30 DIAGNOSIS — N92.0 MENORRHAGIA WITH REGULAR CYCLE: ICD-10-CM

## 2019-04-30 DIAGNOSIS — Z98.890 HX OF ABDOMINOPLASTY: ICD-10-CM

## 2019-04-30 DIAGNOSIS — N94.6 DYSMENORRHEA: Primary | ICD-10-CM

## 2019-04-30 PROCEDURE — 99213 OFFICE O/P EST LOW 20 MIN: CPT | Performed by: OBSTETRICS & GYNECOLOGY

## 2019-04-30 NOTE — H&P (VIEW-ONLY)
[address] [facility]  [facility]; [address]  [place], [address] [place] [address]  [phone] mn [phone] Fax        Li Courtney  1975  Primary Care Physician: ANGELA Hui 224: [facility]      2019  MEDICAID CONSENT COMPLETED: No      HPI: Li Courtney is a 37 y.o. female   Pt with abnormal heavy painful menses. She states they are affecting ADL's. Her  has had a vasectomy but pt wishes the meduically indicated tubal interruption. She initially wanted the tubes removed but due to her tattoo now wishes interruption with Filshie clip application. She had an abnormal pap and completed colposcopy with ECC. She completed an endometrial sampling. She states the symptoms are affecting both parental and work ADL's. She has failed NSAIDs, Depo Provera, OCP's. She wishes conservative surgical management. She is aware of medical and surgical alternative options as reviewed with her. She is demanding her fallopian tubes be interrupted eventhough her  has had a vasectomy RB reviewed.     Relevant Past History:   Patient Active Problem List    Diagnosis Date Noted    Dysmenorrhea 2019     Priority: High    Menorrhagia with regular cycle 10/15/2018     Priority: High    Essential hypertension 2014     Priority: High    Hx of  section x 1 LTC 2019     Priority: Medium    Hx of abdominoplasty 2019     Priority: Medium    Smoker 10/15/2018     Priority: Medium    [facility] B[facility]111I [facility] 10/18/2017     Priority: Medium    Family history of factor V Leiden mutation (daughter) Pt is negative 10/02/2017     Priority: Medium    Chronic cough 10/15/2018    Chronic fatigue 10/15/2018    Microscopic hematuria 2018    Acute pain of right knee 10/23/2017    Right forearm pain 10/23/2017    History of migraine headaches     Mixed hyperlipidemia     Class 2 severe obesity due to excess calories with serious comorbidity and body mass index (BMI) of 39.0 to 39.9 in adult ([facility] 2015         OB History    Para Term  AB Living   3 3 3 0 0 3   SAB TAB Ectopic Molar Multiple Live Births   0 0 0 0 0 3      # Outcome Date GA Lbr Thomas/2nd Weight Sex Delivery Anes PTL Lv   3 Term 2003    M Vag-Spont   JOCELINE   2 Term 1997    F Vag-Spont  N JOCELINE   1 Term 12    F CS-LVertical  N JOCELINE       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 ([facility] 2018       Past Surgical History:   Procedure Laterality Date    ABDOMEN SURGERY      tummy tuck    BACK SURGERY      BREAST SURGERY      lift     SECTION      COLPOSCOPY  2019    CYSTO/URETERO/PYELOSCOPY, CALCULUS TX N/A 2017    CYSTOSCOPY LEFT URETEROSCOPY HOLMIUM LASER, LITHOTRIPSY, LEFT STENT PLACEMENT performed by Geneva Bailey MD at [address] [place] [address]  2017    ureteroscopy, stent, holmium laser    FOOT SURGERY Right 2010    HEMORRHOID SURGERY  2015    removal    TONSILLECTOMY         Social History     Socioeconomic History    Marital status: Single     Spouse name: Not on file    Number of children: Not on file    Years of education: Not on file    Highest education level: Not on file   Occupational History    Not on file   Social Needs    Financial resource strain: Not on file    Food insecurity:     Worry: Not on file     Inability: Not on file    Transportation needs:     Medical: Not on file     Non-medical: Not on file   Tobacco Use    Smoking status: Current Every Day Smoker     Types: Cigarettes    Smokeless tobacco: Never Used   Substance and Sexual Activity    Alcohol use: Yes     Comment: occasssional    Drug use: No    Sexual activity: Yes     Partners: Male   Lifestyle    Physical activity:     Days per week: Not on file Minutes per session: Not on file    Stress: Not on file   Relationships    Social connections:     Talks on phone: Not on file     Gets together: Not on file     Attends Zoroastrianism service: Not on file     Active member of club or organization: Not on file     Attends meetings of clubs or organizations: Not on file     Relationship status: Not on file    Intimate partner violence:     Fear of current or ex partner: Not on file     Emotionally abused: Not on file     Physically abused: Not on file     Forced sexual activity: Not on file   Other Topics Concern    Not on file   Social History Narrative    Not on file       Psychosocial History: Stable    MEDICATIONS:  Current Outpatient Medications   Medication Sig Dispense Refill    buPROPion (WELLBUTRIN XL) 150 MG extended release tablet TAKE 1 TABLET BY MOUTH IN THE MORNING  5    metoprolol tartrate (LOPRESSOR) 25 MG tablet TAKE 2 TABLETS BY MOUTH IN THE MORNING and 1 tablet in the evening 90 tablet 1    loratadine (CLARITIN) 10 MG tablet Take 1 tablet by mouth daily as needed (cough, nasal symptoms) 30 tablet 2    SUMAtriptan (IMITREX) 100 MG tablet Take 1 tablet by mouth once as needed for Migraine 9 tablet 3     No current facility-administered medications for this visit. ALLERGIES:  Allergies as of 04/30/2019 - Review Complete 04/30/2019   Allergen Reaction Noted    Percocet [oxycodone-acetaminophen] Itching and Anxiety 10/02/2017           REVIEW OF SYSTEMS:      General appearance:Appears healthy. Alert; in no acute distress. Pleasant. HEENT: Glasses or contacts no  CARDIOVASCULAR: Denies: chest pain, dyspnea on exertion, palpitations  RESPIRATORY: Denies: cough, shortness of breath, wheezing  GI: Denies: abdominal pain, flank pain, nausea, vomiting, diarrhea  : Denies: dysuria, frequency/urgency, hematuria, pelvic pain  MUSCULOSKELETAL: Denies: back pain, joint swelling, muscle pain  SKIN: Denies: rash, hives.   HEMATOLOGIC: Denies Bleeding Disorder, Coagulopathy or Transfusion  NEUROLOGIC: Denies Migraines, Headaches, CVA, TIA  ENDOCRINE: Denies any Endocrinologic disorders                PHYSICAL EXAM:  Blood pressure 138/84, pulse 80, height 5' 5\" (1.651 m), weight 228 lb (103.4 kg), last menstrual period 04/29/2019, not currently breastfeeding. General Appearance:  awake, alert, oriented, in no acute distress, well developed, well nourished, in no acute distress   Skin:  Skin color, texture, turgor normal. No rashes or lesions  Mouth/Throat:  Mucosa moist.  No lesions. Pharynx without erythema, edema or exudate. Lungs:  Normal expansion. Clear to auscultation. No rales, rhonchi, or wheezing. Heart:  Heart sounds are normal.  Regular rate and rhythm without murmur, gallop or rub. Breast:  Inspection negative. No nipple discharge or bleeding. No palpable mass  Abdomen:  Soft, non-tender, normal bowel sounds. No bruits, organomegaly or masses. Extremities: Extremities warm to touch, pink, with no edema. Musculoskeletal:  negative  Peripheral Pulses:  Normal  Neurologic:  Gait normal. Reflexes normal and symmetric. Sensation grossly intact. Female Urogen:  Declined  Female Rectal: Declined    OMM Structural Component:  The patient did not complain of a Chief complaint requiring OMM. Chief Complaint:none    Structural Exam: No Interest              Diagnostics:          Lab Results:  Results for orders placed or performed during the hospital encounter of 04/11/19   Surgical Pathology   Result Value Ref Range    Surgical Pathology Report       [address] [place] [address]. [address]  [phone]  Fax: [phone]  SURGICAL PATHOLOGY REPORT    Patient Name: Can Kearns  MR#: [id number]  Specimen #[id number]       Final Diagnosis  ENDOMETRIUM, BIOPSY:         PROLIFERATIVE-TYPE ENDOMETRIUM      Oscar Worley.  Kay Goldman D.O.  **Electronically Signed Out**         [facility]/4/16/2019    Clinical Detected                    (Not Detected)        This test amplifies and detects DNA of 14 high-risk HPV types   associated with cervical cancer and its precursor lesions (HPV types   16, 18, 31, 33, 35, 39, 45, 51, 52, 56, 58, 59, 66, and 68). Sensitivity may be affected by specimen collection methods, stage of   infection, and the presence of interfering substances.  Results should   be interpreted in conjunction with other available laboratory and   clinical data.  A negative high-risk HPV result does not exclude the   possibility of future cytologic HSIL or underlying CIN2-3 or cancer. This test is intended for medical purposes only and is not valid for   the evaluation of suspected sexual abuse or for other forensic   purposes.           Surgical Pathology Report 02/27/2019 12:41 PM [facility]   [address] [facility]. [address]   [phone]   Fax: [phone]   SURGICAL PATHOLOGY REPORT     Patient Name: Tracy Chris   MR#: [id number]   Specimen #[id number]         Final Diagnosis   CERVIX, ENDOCERVICAL CURETTAGE:          FRAGMENTS OF ENDOCERVICAL MUCOSAL AND ECTOCERVICAL EPITHELIUM   WITH ACUTE AND CHRONIC INFLAMMATION AND REACTIVE EPITHELIAL CELL   CHANGES     NEGATIVE FOR DYSPLASIA OR HUMAN PAPILLOMA VIRUS CYTOPATHIC CHANGES     Diagnosis Comment   Colposcopy with biopsy is recommended if clinically indicated. Marilu Reynoso M.D.   **Electronically Signed Out**         tc/3/3/2019     Clinical Information   ASCUS with positive high risk HPV cervical     Source:   A: Endocervical curettage     Gross Description   Received in formalin, in a container, labelled with the patient's   name, identifiers and \"ECC\" is an aggregate (2 x 1.5 x 0.2 cm) of tan   hemorrhagic mucinous material.  The specimen is wrapped in lens paper   and entirely submitted in one cassette.      Microscopic Description   Two slides with H&E stained material are examined.  Microscopic   examination confirms the final pathologic diagnosis.       Colposcopy Completed         Counseling: The patient had the procedure discussed with her at length and in detail. The risks and benefits of concurrent ovarian cancer risk reducing bilateral salpingectomy , (Removal of the fallopian tubes), with the patient's upcoming scheduled endometrial ablation. The patient was counseled on the medically indicated need to restrict a future pregnancy after an endometrial ablation. She was counseled on the increased risk of having an accreta, increta, or percreta, (in Lay terms), with any future pregnancy and the risks each of those carry with them. She is aware after the counseling that if one of the previously listed situations occurred that there would be a high probability that she would need a blood transfusion and/or a   hysterectomy and even with those interventions maternal death is 50% with the percreta. The patient was offered a medically indicated tubal removal, Risk Reduction Procedure. This would entail removing of the fallopian tubes including the fimbriae. She was informed this would require three incisions that were small and where they would be located on her abdomen. She was also given the information of a tubal interruption, clipping or burning of the fallopian tubes. She was made aware that this requires two incisions that were the same size as the procedure to remove her fallopian tubes. She was counseled on the alternate location of those incisions. She was given the information of Risk Reduction from future primary peritoneal Carcinoma being upwards of 65% with removal of the fallopian tubes and 45-50% with interruption of the fallopian tubes. Finally, the patient has been thoroughly counseled regarding the consequence of loss of fertility following this procedure.  The patient understands that this loss of fertility cannot be reversed and has expressed via verbal and written consent that her wishes are to proceed with this surgery for the purposes of reducing risk for ovarian cancer. The patient is requesting to have her fallopian tubes Interrupted with Filshie clips due to her tattoo . Risk reduction Counseling for Primary Peritoneal/Ovarian Cancer: The patient was counseled on the risk factors that make her above the average risk for development of primary peritoneal/ovarian cancer. The patient was also counseled on the options of completing Risk Reduction Surgery with her upcoming pelvic gynecologic or abdominal surgery. The procedure with its associated risks, benefits, and alternatives were reviewed at length. Per the the recommendations from the Society of Gynecologic Oncology and the [address] [place] [address] of Obstetricians and Gynecologists, the patient had the procedure reviewed and was informed of the potential benefits of such a procedure. The primary benefit is a greater than 50% reduction in the future risk of development Primary Peritoneal/Ovarian cancer. She was informed that large scale studies have not shown an increase in the estimated blood loss, complications, or operating time. The patient was made aware that bleeding, infection, and injury to nearby organs are potential complications with this additional surgery. The patient was also informed and had this reviewed in detail with her that once the risk reduction procedure is completed she will have lost the fertility opportunity, to conceive naturally, going forward and that any future conception would require reproductive assisted approaches; (IVF, GIFT,ZIFT)-all described in lay terms to the patient. The patient was counseled and understands that the loss of fertility can not be reversed. She voiced understanding of this and signed a written consent.  She was also counseled that any future pregnancy (18-20/1000 cases annually), carries an added increase risk of ectopic pregnancy and the potential need for future surgery. The patient still wishes to proceed with the risk reduction surgery. The patient had explained to her that the completion of the procedure does not guarantee her that she will not be diagnosed with a future primary peritoneal or ovarian malignancy but her risks are greatly reduced. Assessment:     Diagnosis Orders   1. Dysmenorrhea     2. Menorrhagia with regular cycle     3. Enlarged uterus     4. Hx of  section     5. 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 [facility] 10/18/2017     Priority: Medium    Family history of factor V Leiden mutation (daughter) Pt is negative 10/02/2017     Priority: Medium    Chronic cough 10/15/2018    Chronic fatigue 10/15/2018    Microscopic hematuria 2018    Acute pain of right knee 10/23/2017    Right forearm pain 10/23/2017    History of migraine headaches     Mixed hyperlipidemia     Class 2 severe obesity due to excess calories with serious comorbidity and body mass index (BMI) of 39.0 to 39.9 in adult ([facility] 2015             Plan:  1. Laparoscopic tubal interruption (Medically Indicated)-Filshie clips  2. Hysteroscopic Novasure Endometrial Ablation    No orders of the defined types were placed in this encounter. Counseling: The patient was counseled on all options both medical and surgical, conservative as well as definitive. She has elected to proceed with the procedure as stated above. The patient was counseled on the procedure.  Risks and complications were reviewed in detail. The patients orders, labs, consents have been completed. The history and physical as well as all supporting surgical documentation will be forwarded to the pre-operative holding area. The patient is aware that this procedure may not alleviate her symptoms. That there may be a necessity for a second surgery and that there may be an incomplete removal of abnormal tissue. The patients that are undergoing a procedure for family planning WERE INSTRUCTED THAT THE PROCEDURE IS PERMANENT AND NON REVERSIBLE. FAILURE RATES OF 18-20/1000 CASES WERE REVIEWED AS WELL AS ALL ALTERNATE REVERSIBLE FORMS OF BIRTH CONTROL MALE AND FEMALE. THEY WERE COUNSELED THAT A FAILURE WOULD MOST LIKELY END UP IN AN ECTOPIC PREGNANCY, A PREGNANCY OUTSIDE OF THE UTERUS MOST COMMONLY IN THE FALLOPIAN TUBE, NECESSITATING FURTHER SURGERY, A BLOOD TRANSFUSION OR BOTH. POST TUBAL STERILIZATION SYNDROME WAS ALSO DISCUSSED WITH THE PATIENT AT LENGTH.              ________________________________________D. O.  Date:_______________  Devonda Olszewski, DO

## 2019-04-30 NOTE — PROGRESS NOTES
[address] [facility]ne[facility]  [facility]; [address]  [place], [address] [place] [address]  [phone] mn [phone] Fax        Jo Cristina  1975  Primary Care Physician: ANGELA Bradley 224: [facility]      2019  MEDICAID CONSENT COMPLETED: No      HPI: Jo Cristina is a 37 y.o. female   Pt with abnormal heavy painful menses. She states they are affecting ADL's. Her  has had a vasectomy but pt wishes the meduically indicated tubal interruption. She initially wanted the tubes removed but due to her tattoo now wishes interruption with Filshie clip application. She had an abnormal pap and completed colposcopy with ECC. She completed an endometrial sampling. She states the symptoms are affecting both parental and work ADL's. She has failed NSAIDs, Depo Provera, OCP's. She wishes conservative surgical management. She is aware of medical and surgical alternative options as reviewed with her. She is demanding her fallopian tubes be interrupted eventhough her  has had a vasectomy RB reviewed.     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 ([facility] 10/18/2017     Priority: Medium    Family history of factor V Leiden mutation (daughter) Pt is negative 10/02/2017     Priority: Medium    Chronic cough 10/15/2018    Chronic fatigue 10/15/2018    Microscopic hematuria 2018    Acute pain of right knee 10/23/2017    Right forearm pain 10/23/2017    History of migraine headaches     Mixed hyperlipidemia     Class 2 severe obesity due to Minutes per session: Not on file    Stress: Not on file   Relationships    Social connections:     Talks on phone: Not on file     Gets together: Not on file     Attends Yazidi service: Not on file     Active member of club or organization: Not on file     Attends meetings of clubs or organizations: Not on file     Relationship status: Not on file    Intimate partner violence:     Fear of current or ex partner: Not on file     Emotionally abused: Not on file     Physically abused: Not on file     Forced sexual activity: Not on file   Other Topics Concern    Not on file   Social History Narrative    Not on file       Psychosocial History: Stable    MEDICATIONS:  Current Outpatient Medications   Medication Sig Dispense Refill    buPROPion (WELLBUTRIN XL) 150 MG extended release tablet TAKE 1 TABLET BY MOUTH IN THE MORNING  5    metoprolol tartrate (LOPRESSOR) 25 MG tablet TAKE 2 TABLETS BY MOUTH IN THE MORNING and 1 tablet in the evening 90 tablet 1    loratadine (CLARITIN) 10 MG tablet Take 1 tablet by mouth daily as needed (cough, nasal symptoms) 30 tablet 2    SUMAtriptan (IMITREX) 100 MG tablet Take 1 tablet by mouth once as needed for Migraine 9 tablet 3     No current facility-administered medications for this visit. ALLERGIES:  Allergies as of 04/30/2019 - Review Complete 04/30/2019   Allergen Reaction Noted    Percocet [oxycodone-acetaminophen] Itching and Anxiety 10/02/2017           REVIEW OF SYSTEMS:      General appearance:Appears healthy. Alert; in no acute distress. Pleasant. HEENT: Glasses or contacts no  CARDIOVASCULAR: Denies: chest pain, dyspnea on exertion, palpitations  RESPIRATORY: Denies: cough, shortness of breath, wheezing  GI: Denies: abdominal pain, flank pain, nausea, vomiting, diarrhea  : Denies: dysuria, frequency/urgency, hematuria, pelvic pain  MUSCULOSKELETAL: Denies: back pain, joint swelling, muscle pain  SKIN: Denies: rash, hives.   HEMATOLOGIC: Denies Bleeding Disorder, Coagulopathy or Transfusion  NEUROLOGIC: Denies Migraines, Headaches, CVA, TIA  ENDOCRINE: Denies any Endocrinologic disorders                PHYSICAL EXAM:  Blood pressure 138/84, pulse 80, height 5' 5\" (1.651 m), weight 228 lb (103.4 kg), last menstrual period 04/29/2019, not currently breastfeeding. General Appearance:  awake, alert, oriented, in no acute distress, well developed, well nourished, in no acute distress   Skin:  Skin color, texture, turgor normal. No rashes or lesions  Mouth/Throat:  Mucosa moist.  No lesions. Pharynx without erythema, edema or exudate. Lungs:  Normal expansion. Clear to auscultation. No rales, rhonchi, or wheezing. Heart:  Heart sounds are normal.  Regular rate and rhythm without murmur, gallop or rub. Breast:  Inspection negative. No nipple discharge or bleeding. No palpable mass  Abdomen:  Soft, non-tender, normal bowel sounds. No bruits, organomegaly or masses. Extremities: Extremities warm to touch, pink, with no edema. Musculoskeletal:  negative  Peripheral Pulses:  Normal  Neurologic:  Gait normal. Reflexes normal and symmetric. Sensation grossly intact. Female Urogen:  Declined  Female Rectal: Declined    OMM Structural Component:  The patient did not complain of a Chief complaint requiring OMM. Chief Complaint:none    Structural Exam: No Interest              Diagnostics:          Lab Results:  Results for orders placed or performed during the hospital encounter of 04/11/19   Surgical Pathology   Result Value Ref Range    Surgical Pathology Report       [address]  [address] [facility]. [place], [address] [place] [address]  [phone]  Fax: [phone]  SURGICAL PATHOLOGY REPORT    Patient Name: Liane Beckford  MR#: [id number]  Specimen #[id number]       Final Diagnosis  ENDOMETRIUM, BIOPSY:         PROLIFERATIVE-TYPE ENDOMETRIUM      Tiffani Pelletier.  Nikia Montenegro D.O.  **Electronically Signed Out**         clj/4/16/2019    Clinical Information  Endometrial biopsy No formalin time on surgical slip. Source:  A: Endometrial biopsy    Gross Description  Received in formalin labeled \"EMB\" are portions of pink soft tissue  measuring 2 x 2 x 0.3 cm. The specimen is entirely submitted in a  single cassette. Microscopic Description  Microscopic examination of 2 H&E slides confirms the diagnosis. Cytology Report 02/08/2019  9:[address]  [address]   [address] [facility]   ANATOMIC PATHOLOGY   [address] [place] [address],  [address]. [place], [address]   [phone]   Fax: [phone]   GYNECOLOGIC CYTOLOGY REPORT     Patient Name: Princess Saunders   MR#: [id number]   Specimen #[id number]   Source:   1: Cervical material, (ThinPrep vial, Imaging-assisted review)     Clinical History   Z01.419 Routine gyn exam without abnormal findings   Co-Test:  ThinPrep Pap with high risk HPV testing     INTERPRETATION     Cervical material, (ThinPrep vial, Imaging-assisted review):   Specimen Adequacy:        Satisfactory for evaluation.        -Endocervical/transformation zone component is absent. Descriptive Diagnosis:        Atypical squamous cells of undetermined significance (ASC-US).  Comments:        High Risk HPV testing was ordered. Cytotechnologist:   ANDREZ Thakur M.D.   **Electronically Signed Out**         sls/2/22/2019           Procedure/Addendum   HPV Procedure Report     Date Ordered:     2/11/2019     Status:   Signed Out        Date Complete:     2/11/2019     By: PABLITO De Los Santos(ASCP)        Date Reported:     2/25/2019       INTERPRETATION   Roche HPV DNA High Risk                                                         HPV Sample               Thin Prep                    (Ref Range)   HPV Type 16               Not Detected                    (Not   Detected)   HPV Type 18               Not Detected                    (Not   Detected)   Other High Risk HPV     Detected                    (Not Detected)        This test amplifies and detects DNA of 14 high-risk HPV types   associated with cervical cancer and its precursor lesions (HPV types   16, 18, 31, 33, 35, 39, 45, 51, 52, 56, 58, 59, 66, and 68). Sensitivity may be affected by specimen collection methods, stage of   infection, and the presence of interfering substances.  Results should   be interpreted in conjunction with other available laboratory and   clinical data.  A negative high-risk HPV result does not exclude the   possibility of future cytologic HSIL or underlying CIN2-3 or cancer. This test is intended for medical purposes only and is not valid for   the evaluation of suspected sexual abuse or for other forensic   purposes.           Surgical Pathology Report 02/27/2019 12:41 PM [facility]   [address] [facility]. [address]   [phone]   Fax: [phone]   SURGICAL PATHOLOGY REPORT     Patient Name: Tracy Chris   MR#: [id number]   Specimen #[id number]         Final Diagnosis   CERVIX, ENDOCERVICAL CURETTAGE:          FRAGMENTS OF ENDOCERVICAL MUCOSAL AND ECTOCERVICAL EPITHELIUM   WITH ACUTE AND CHRONIC INFLAMMATION AND REACTIVE EPITHELIAL CELL   CHANGES     NEGATIVE FOR DYSPLASIA OR HUMAN PAPILLOMA VIRUS CYTOPATHIC CHANGES     Diagnosis Comment   Colposcopy with biopsy is recommended if clinically indicated. Marilu Reynoso M.D.   **Electronically Signed Out**         tc/3/3/2019     Clinical Information   ASCUS with positive high risk HPV cervical     Source:   A: Endocervical curettage     Gross Description   Received in formalin, in a container, labelled with the patient's   name, identifiers and \"ECC\" is an aggregate (2 x 1.5 x 0.2 cm) of tan   hemorrhagic mucinous material.  The specimen is wrapped in lens paper   and entirely submitted in one cassette.      Microscopic Description   Two slides with H&E stained material are examined.  Microscopic   examination confirms the final pathologic diagnosis.       Colposcopy Completed         Counseling: The patient had the procedure discussed with her at length and in detail. The risks and benefits of concurrent ovarian cancer risk reducing bilateral salpingectomy , (Removal of the fallopian tubes), with the patient's upcoming scheduled endometrial ablation. The patient was counseled on the medically indicated need to restrict a future pregnancy after an endometrial ablation. She was counseled on the increased risk of having an accreta, increta, or percreta, (in Lay terms), with any future pregnancy and the risks each of those carry with them. She is aware after the counseling that if one of the previously listed situations occurred that there would be a high probability that she would need a blood transfusion and/or a   hysterectomy and even with those interventions maternal death is 50% with the percreta. The patient was offered a medically indicated tubal removal, Risk Reduction Procedure. This would entail removing of the fallopian tubes including the fimbriae. She was informed this would require three incisions that were small and where they would be located on her abdomen. She was also given the information of a tubal interruption, clipping or burning of the fallopian tubes. She was made aware that this requires two incisions that were the same size as the procedure to remove her fallopian tubes. She was counseled on the alternate location of those incisions. She was given the information of Risk Reduction from future primary peritoneal Carcinoma being upwards of 65% with removal of the fallopian tubes and 45-50% with interruption of the fallopian tubes. Finally, the patient has been thoroughly counseled regarding the consequence of loss of fertility following this procedure.  The patient understands that this loss of fertility cannot be reversed and has expressed via verbal and written consent that her wishes are to proceed with this surgery for the purposes of reducing risk for ovarian cancer. The patient is requesting to have her fallopian tubes Interrupted with Filshie clips due to her tattoo . Risk reduction Counseling for Primary Peritoneal/Ovarian Cancer: The patient was counseled on the risk factors that make her above the average risk for development of primary peritoneal/ovarian cancer. The patient was also counseled on the options of completing Risk Reduction Surgery with her upcoming pelvic gynecologic or abdominal surgery. The procedure with its associated risks, benefits, and alternatives were reviewed at length. Per the the recommendations from the Society of Gynecologic Oncology and the [employer] of Obstetricians and Gynecologists, the patient had the procedure reviewed and was informed of the potential benefits of such a procedure. The primary benefit is a greater than 50% reduction in the future risk of development Primary Peritoneal/Ovarian cancer. She was informed that large scale studies have not shown an increase in the estimated blood loss, complications, or operating time. The patient was made aware that bleeding, infection, and injury to nearby organs are potential complications with this additional surgery. The patient was also informed and had this reviewed in detail with her that once the risk reduction procedure is completed she will have lost the fertility opportunity, to conceive naturally, going forward and that any future conception would require reproductive assisted approaches; (IVF, GIFT,ZIFT)-all described in lay terms to the patient. The patient was counseled and understands that the loss of fertility can not be reversed. She voiced understanding of this and signed a written consent.  She was also counseled that any future pregnancy (18-20/1000 cases annually), carries an added increase risk of ectopic pregnancy and the potential need for future surgery. The patient still wishes to proceed with the risk reduction surgery. The patient had explained to her that the completion of the procedure does not guarantee her that she will not be diagnosed with a future primary peritoneal or ovarian malignancy but her risks are greatly reduced. Assessment:     Diagnosis Orders   1. Dysmenorrhea     2. Menorrhagia with regular cycle     3. Enlarged uterus     4. Hx of  section     5. Hx of abdominoplasty         Patient Active Problem List    Diagnosis Date Noted    Dysmenorrhea 2019     Priority: High    Menorrhagia with regular cycle 10/15/2018     Priority: High    Essential hypertension 2014     Priority: High    Hx of  section x 1 LTC 2019     Priority: Medium    Hx of abdominoplasty 2019     Priority: Medium    Smoker 10/15/2018     Priority: Medium    [facility]FR [id number] [facility] 10/18/2017     Priority: Medium    Family history of factor V Leiden mutation (daughter) Pt is negative 10/02/2017     Priority: Medium    Chronic cough 10/15/2018    Chronic fatigue 10/15/2018    Microscopic hematuria 2018    Acute pain of right knee 10/23/2017    Right forearm pain 10/23/2017    History of migraine headaches     Mixed hyperlipidemia     Class 2 severe obesity due to excess calories with serious comorbidity and body mass index (BMI) of 39.0 to 39.9 in adult ([facility] 2015             Plan:  1. Laparoscopic tubal interruption (Medically Indicated)-Filshie clips  2. Hysteroscopic Novasure Endometrial Ablation    No orders of the defined types were placed in this encounter. Counseling: The patient was counseled on all options both medical and surgical, conservative as well as definitive. She has elected to proceed with the procedure as stated above. The patient was counseled on the procedure.  Risks and complications were reviewed in detail. The patients orders, labs, consents have been completed. The history and physical as well as all supporting surgical documentation will be forwarded to the pre-operative holding area. The patient is aware that this procedure may not alleviate her symptoms. That there may be a necessity for a second surgery and that there may be an incomplete removal of abnormal tissue. The patients that are undergoing a procedure for family planning WERE INSTRUCTED THAT THE PROCEDURE IS PERMANENT AND NON REVERSIBLE. FAILURE RATES OF 18- CASES WERE REVIEWED AS WELL AS ALL ALTERNATE REVERSIBLE FORMS OF BIRTH CONTROL MALE AND FEMALE. THEY WERE COUNSELED THAT A FAILURE WOULD MOST LIKELY END UP IN AN ECTOPIC PREGNANCY, A PREGNANCY OUTSIDE OF THE UTERUS MOST COMMONLY IN THE FALLOPIAN TUBE, NECESSITATING FURTHER SURGERY, A BLOOD TRANSFUSION OR BOTH. POST TUBAL STERILIZATION SYNDROME WAS ALSO DISCUSSED WITH THE PATIENT AT LENGTH.              ________________________________________D. O. Date:_______________  Nigel Fournier DO        Patient was seen with total face to face time of 15 minutes. More than 50% of this visit was on counseling and education regarding her    Diagnosis Orders   1. Dysmenorrhea     2. Menorrhagia with regular cycle     3. Enlarged uterus     4. Hx of  section     5. Hx of abdominoplasty      and her options. She was also counseled on her preventative health maintenance recommendations and follow-up.

## 2019-05-08 ENCOUNTER — HOSPITAL ENCOUNTER (OUTPATIENT)
Dept: GENERAL RADIOLOGY | Age: 44
Discharge: HOME | End: 2019-05-10
Payer: COMMERCIAL

## 2019-05-08 ENCOUNTER — HOSPITAL ENCOUNTER (OUTPATIENT)
Dept: PREADMISSION TESTING | Age: 44
Discharge: HOME | End: 2019-05-12
Payer: COMMERCIAL

## 2019-05-08 VITALS
SYSTOLIC BLOOD PRESSURE: 145 MMHG | DIASTOLIC BLOOD PRESSURE: 85 MMHG | RESPIRATION RATE: 18 BRPM | HEIGHT: 65 IN | OXYGEN SATURATION: 98 % | BODY MASS INDEX: 37.54 KG/M2 | WEIGHT: 225.3 LBS | TEMPERATURE: 97.5 F | HEART RATE: 64 BPM

## 2019-05-08 DIAGNOSIS — Z01.818 PREOP TESTING: ICD-10-CM

## 2019-05-08 LAB
ABO/RH: NORMAL
ABSOLUTE EOS #: 0 K/UL (ref 0–0.4)
ABSOLUTE IMMATURE GRANULOCYTE: ABNORMAL K/UL (ref 0–0.3)
ABSOLUTE LYMPH #: 1.8 K/UL (ref 1–4.8)
ABSOLUTE MONO #: 0.4 K/UL (ref 0.1–1.3)
ANION GAP SERPL CALCULATED.3IONS-SCNC: 11 MMOL/L (ref 9–17)
ANTIBODY SCREEN: NEGATIVE
ARM BAND NUMBER: NORMAL
BASOPHILS # BLD: 1 % (ref 0–2)
BASOPHILS ABSOLUTE: 0 K/UL (ref 0–0.2)
BILIRUBIN URINE: NEGATIVE
BLOOD BANK COMMENT: NORMAL
BUN BLDV-MCNC: 21 MG/DL (ref 6–20)
BUN/CREAT BLD: ABNORMAL (ref 9–20)
CALCIUM SERPL-MCNC: 9.9 MG/DL (ref 8.6–10.4)
CHLORIDE BLD-SCNC: 102 MMOL/L (ref 98–107)
CO2: 26 MMOL/L (ref 20–31)
COLOR: YELLOW
COMMENT UA: NORMAL
CREAT SERPL-MCNC: 0.86 MG/DL (ref 0.5–0.9)
DIFFERENTIAL TYPE: ABNORMAL
EOSINOPHILS RELATIVE PERCENT: 0 % (ref 0–4)
EXPIRATION DATE: NORMAL
GFR AFRICAN AMERICAN: >60 ML/MIN
GFR NON-AFRICAN AMERICAN: >60 ML/MIN
GFR SERPL CREATININE-BSD FRML MDRD: ABNORMAL ML/MIN/{1.73_M2}
GFR SERPL CREATININE-BSD FRML MDRD: ABNORMAL ML/MIN/{1.73_M2}
GLUCOSE BLD-MCNC: 100 MG/DL (ref 70–99)
GLUCOSE URINE: NEGATIVE
HCG QUANTITATIVE: <1 IU/L
HCT VFR BLD CALC: 43.8 % (ref 36–46)
HEMOGLOBIN: 15 G/DL (ref 12–16)
IMMATURE GRANULOCYTES: ABNORMAL %
INR BLD: 0.9
KETONES, URINE: NEGATIVE
LEUKOCYTE ESTERASE, URINE: NEGATIVE
LYMPHOCYTES # BLD: 26 % (ref 24–44)
MCH RBC QN AUTO: 29.8 PG (ref 26–34)
MCHC RBC AUTO-ENTMCNC: 34.3 G/DL (ref 31–37)
MCV RBC AUTO: 86.9 FL (ref 80–100)
MONOCYTES # BLD: 6 % (ref 1–7)
NITRITE, URINE: NEGATIVE
NRBC AUTOMATED: ABNORMAL PER 100 WBC
PARTIAL THROMBOPLASTIN TIME: 25.7 SEC (ref 24–36)
PDW BLD-RTO: 13.2 % (ref 11.5–14.9)
PH UA: 6 (ref 5–8)
PLATELET # BLD: 298 K/UL (ref 150–450)
PLATELET ESTIMATE: ABNORMAL
PMV BLD AUTO: 9.1 FL (ref 6–12)
POTASSIUM SERPL-SCNC: 4.6 MMOL/L (ref 3.7–5.3)
PROTEIN UA: NEGATIVE
PROTHROMBIN TIME: 12.5 SEC (ref 11.8–14.6)
RBC # BLD: 5.04 M/UL (ref 4–5.2)
RBC # BLD: ABNORMAL 10*6/UL
SEG NEUTROPHILS: 67 % (ref 36–66)
SEGMENTED NEUTROPHILS ABSOLUTE COUNT: 4.4 K/UL (ref 1.3–9.1)
SODIUM BLD-SCNC: 139 MMOL/L (ref 135–144)
SPECIFIC GRAVITY UA: 1.02 (ref 1–1.03)
TURBIDITY: CLEAR
URINE HGB: NEGATIVE
UROBILINOGEN, URINE: NORMAL
WBC # BLD: 6.7 K/UL (ref 3.5–11)
WBC # BLD: ABNORMAL 10*3/UL

## 2019-05-08 PROCEDURE — 71046 X-RAY EXAM CHEST 2 VIEWS: CPT

## 2019-05-08 PROCEDURE — 85610 PROTHROMBIN TIME: CPT

## 2019-05-08 PROCEDURE — 85730 THROMBOPLASTIN TIME PARTIAL: CPT

## 2019-05-08 PROCEDURE — 81003 URINALYSIS AUTO W/O SCOPE: CPT

## 2019-05-08 PROCEDURE — 36415 COLL VENOUS BLD VENIPUNCTURE: CPT

## 2019-05-08 PROCEDURE — 86900 BLOOD TYPING SEROLOGIC ABO: CPT

## 2019-05-08 PROCEDURE — 93005 ELECTROCARDIOGRAM TRACING: CPT

## 2019-05-08 PROCEDURE — 84702 CHORIONIC GONADOTROPIN TEST: CPT

## 2019-05-08 PROCEDURE — 87086 URINE CULTURE/COLONY COUNT: CPT

## 2019-05-08 PROCEDURE — 85025 COMPLETE CBC W/AUTO DIFF WBC: CPT

## 2019-05-08 PROCEDURE — 86850 RBC ANTIBODY SCREEN: CPT

## 2019-05-08 PROCEDURE — 86901 BLOOD TYPING SEROLOGIC RH(D): CPT

## 2019-05-08 PROCEDURE — 80048 BASIC METABOLIC PNL TOTAL CA: CPT

## 2019-05-09 ENCOUNTER — ANESTHESIA EVENT (OUTPATIENT)
Dept: OPERATING ROOM | Age: 44
End: 2019-05-09
Payer: COMMERCIAL

## 2019-05-09 LAB
CULTURE: NORMAL
Lab: NORMAL
SPECIMEN DESCRIPTION: NORMAL

## 2019-05-09 RX ORDER — LIDOCAINE HYDROCHLORIDE 10 MG/ML
1 INJECTION, SOLUTION EPIDURAL; INFILTRATION; INTRACAUDAL; PERINEURAL
Status: CANCELLED | OUTPATIENT
Start: 2019-05-09 | End: 2019-05-09

## 2019-05-09 RX ORDER — SODIUM CHLORIDE 0.9 % (FLUSH) 0.9 %
10 SYRINGE (ML) INJECTION PRN
Status: CANCELLED | OUTPATIENT
Start: 2019-05-09

## 2019-05-09 RX ORDER — SODIUM CHLORIDE 0.9 % (FLUSH) 0.9 %
10 SYRINGE (ML) INJECTION EVERY 12 HOURS SCHEDULED
Status: CANCELLED | OUTPATIENT
Start: 2019-05-09

## 2019-05-09 RX ORDER — SODIUM CHLORIDE, SODIUM LACTATE, POTASSIUM CHLORIDE, CALCIUM CHLORIDE 600; 310; 30; 20 MG/100ML; MG/100ML; MG/100ML; MG/100ML
INJECTION, SOLUTION INTRAVENOUS CONTINUOUS
Status: CANCELLED | OUTPATIENT
Start: 2019-05-09

## 2019-05-15 ENCOUNTER — ANESTHESIA (OUTPATIENT)
Dept: OPERATING ROOM | Age: 44
End: 2019-05-15
Payer: COMMERCIAL

## 2019-05-15 ENCOUNTER — HOSPITAL ENCOUNTER (OUTPATIENT)
Age: 44
Setting detail: OUTPATIENT SURGERY
Discharge: HOME | End: 2019-05-15
Attending: OBSTETRICS & GYNECOLOGY | Admitting: OBSTETRICS & GYNECOLOGY
Payer: COMMERCIAL

## 2019-05-15 ENCOUNTER — TELEPHONE (OUTPATIENT)
Dept: OBGYN CLINIC | Age: 44
End: 2019-05-15

## 2019-05-15 VITALS
BODY MASS INDEX: 37.53 KG/M2 | TEMPERATURE: 98.4 F | HEART RATE: 83 BPM | DIASTOLIC BLOOD PRESSURE: 63 MMHG | WEIGHT: 225.25 LBS | OXYGEN SATURATION: 93 % | HEIGHT: 65 IN | SYSTOLIC BLOOD PRESSURE: 124 MMHG | RESPIRATION RATE: 16 BRPM

## 2019-05-15 VITALS — TEMPERATURE: 96.3 F | OXYGEN SATURATION: 96 % | DIASTOLIC BLOOD PRESSURE: 84 MMHG | SYSTOLIC BLOOD PRESSURE: 154 MMHG

## 2019-05-15 DIAGNOSIS — Z98.890 H/O LAPAROSCOPY: Primary | ICD-10-CM

## 2019-05-15 LAB
-: NORMAL
HCG, PREGNANCY URINE (POC): NEGATIVE

## 2019-05-15 PROCEDURE — 7100000001 HC PACU RECOVERY - ADDTL 15 MIN: Performed by: OBSTETRICS & GYNECOLOGY

## 2019-05-15 PROCEDURE — 58563 HYSTEROSCOPY ABLATION: CPT | Performed by: OBSTETRICS & GYNECOLOGY

## 2019-05-15 PROCEDURE — 3600000004 HC SURGERY LEVEL 4 BASE: Performed by: OBSTETRICS & GYNECOLOGY

## 2019-05-15 PROCEDURE — 2720000010 HC SURG SUPPLY STERILE: Performed by: OBSTETRICS & GYNECOLOGY

## 2019-05-15 PROCEDURE — 49320 DIAG LAPARO SEPARATE PROC: CPT | Performed by: OBSTETRICS & GYNECOLOGY

## 2019-05-15 PROCEDURE — 2500000003 HC RX 250 WO HCPCS: Performed by: OBSTETRICS & GYNECOLOGY

## 2019-05-15 PROCEDURE — 3700000001 HC ADD 15 MINUTES (ANESTHESIA): Performed by: OBSTETRICS & GYNECOLOGY

## 2019-05-15 PROCEDURE — 2500000003 HC RX 250 WO HCPCS: Performed by: NURSE ANESTHETIST, CERTIFIED REGISTERED

## 2019-05-15 PROCEDURE — 7100000031 HC ASPR PHASE II RECOVERY - ADDTL 15 MIN: Performed by: OBSTETRICS & GYNECOLOGY

## 2019-05-15 PROCEDURE — 84703 CHORIONIC GONADOTROPIN ASSAY: CPT

## 2019-05-15 PROCEDURE — 3600000014 HC SURGERY LEVEL 4 ADDTL 15MIN: Performed by: OBSTETRICS & GYNECOLOGY

## 2019-05-15 PROCEDURE — 3700000000 HC ANESTHESIA ATTENDED CARE: Performed by: OBSTETRICS & GYNECOLOGY

## 2019-05-15 PROCEDURE — 2580000003 HC RX 258: Performed by: ANESTHESIOLOGY

## 2019-05-15 PROCEDURE — 7100000000 HC PACU RECOVERY - FIRST 15 MIN: Performed by: OBSTETRICS & GYNECOLOGY

## 2019-05-15 PROCEDURE — 2709999900 HC NON-CHARGEABLE SUPPLY: Performed by: OBSTETRICS & GYNECOLOGY

## 2019-05-15 PROCEDURE — 6360000002 HC RX W HCPCS: Performed by: NURSE ANESTHETIST, CERTIFIED REGISTERED

## 2019-05-15 PROCEDURE — 7100000030 HC ASPR PHASE II RECOVERY - FIRST 15 MIN: Performed by: OBSTETRICS & GYNECOLOGY

## 2019-05-15 PROCEDURE — 2580000003 HC RX 258: Performed by: OBSTETRICS & GYNECOLOGY

## 2019-05-15 RX ORDER — LIDOCAINE HYDROCHLORIDE 20 MG/ML
INJECTION, SOLUTION EPIDURAL; INFILTRATION; INTRACAUDAL; PERINEURAL PRN
Status: DISCONTINUED | OUTPATIENT
Start: 2019-05-15 | End: 2019-05-15 | Stop reason: SDUPTHER

## 2019-05-15 RX ORDER — NEOSTIGMINE METHYLSULFATE 5 MG/5 ML
SYRINGE (ML) INTRAVENOUS PRN
Status: DISCONTINUED | OUTPATIENT
Start: 2019-05-15 | End: 2019-05-15 | Stop reason: SDUPTHER

## 2019-05-15 RX ORDER — DIPHENHYDRAMINE HYDROCHLORIDE 50 MG/ML
12.5 INJECTION INTRAMUSCULAR; INTRAVENOUS
Status: DISCONTINUED | OUTPATIENT
Start: 2019-05-15 | End: 2019-05-15 | Stop reason: HOSPADM

## 2019-05-15 RX ORDER — EPHEDRINE SULFATE/0.9% NACL/PF 50 MG/5 ML
SYRINGE (ML) INTRAVENOUS PRN
Status: DISCONTINUED | OUTPATIENT
Start: 2019-05-15 | End: 2019-05-15 | Stop reason: SDUPTHER

## 2019-05-15 RX ORDER — SODIUM CHLORIDE, SODIUM LACTATE, POTASSIUM CHLORIDE, CALCIUM CHLORIDE 600; 310; 30; 20 MG/100ML; MG/100ML; MG/100ML; MG/100ML
INJECTION, SOLUTION INTRAVENOUS CONTINUOUS
Status: DISCONTINUED | OUTPATIENT
Start: 2019-05-15 | End: 2019-05-15 | Stop reason: HOSPADM

## 2019-05-15 RX ORDER — ONDANSETRON 2 MG/ML
4 INJECTION INTRAMUSCULAR; INTRAVENOUS
Status: DISCONTINUED | OUTPATIENT
Start: 2019-05-15 | End: 2019-05-15 | Stop reason: HOSPADM

## 2019-05-15 RX ORDER — PROPOFOL 10 MG/ML
INJECTION, EMULSION INTRAVENOUS PRN
Status: DISCONTINUED | OUTPATIENT
Start: 2019-05-15 | End: 2019-05-15 | Stop reason: SDUPTHER

## 2019-05-15 RX ORDER — SUCCINYLCHOLINE CHLORIDE 20 MG/ML
INJECTION INTRAMUSCULAR; INTRAVENOUS PRN
Status: DISCONTINUED | OUTPATIENT
Start: 2019-05-15 | End: 2019-05-15 | Stop reason: SDUPTHER

## 2019-05-15 RX ORDER — SODIUM CHLORIDE 0.9 % (FLUSH) 0.9 %
10 SYRINGE (ML) INJECTION EVERY 12 HOURS SCHEDULED
Status: DISCONTINUED | OUTPATIENT
Start: 2019-05-15 | End: 2019-05-15 | Stop reason: HOSPADM

## 2019-05-15 RX ORDER — KETOROLAC TROMETHAMINE 30 MG/ML
INJECTION, SOLUTION INTRAMUSCULAR; INTRAVENOUS PRN
Status: DISCONTINUED | OUTPATIENT
Start: 2019-05-15 | End: 2019-05-15 | Stop reason: SDUPTHER

## 2019-05-15 RX ORDER — MIDAZOLAM HYDROCHLORIDE 1 MG/ML
INJECTION INTRAMUSCULAR; INTRAVENOUS PRN
Status: DISCONTINUED | OUTPATIENT
Start: 2019-05-15 | End: 2019-05-15 | Stop reason: SDUPTHER

## 2019-05-15 RX ORDER — HYDROCODONE BITARTRATE AND ACETAMINOPHEN 5; 325 MG/1; MG/1
1 TABLET ORAL EVERY 6 HOURS PRN
Qty: 20 TABLET | Refills: 0 | Status: SHIPPED | OUTPATIENT
Start: 2019-05-15 | End: 2019-05-20

## 2019-05-15 RX ORDER — BUPIVACAINE HYDROCHLORIDE 5 MG/ML
INJECTION, SOLUTION EPIDURAL; INTRACAUDAL PRN
Status: DISCONTINUED | OUTPATIENT
Start: 2019-05-15 | End: 2019-05-15 | Stop reason: ALTCHOICE

## 2019-05-15 RX ORDER — ROCURONIUM BROMIDE 10 MG/ML
INJECTION, SOLUTION INTRAVENOUS PRN
Status: DISCONTINUED | OUTPATIENT
Start: 2019-05-15 | End: 2019-05-15 | Stop reason: SDUPTHER

## 2019-05-15 RX ORDER — DEXAMETHASONE SODIUM PHOSPHATE 4 MG/ML
INJECTION, SOLUTION INTRA-ARTICULAR; INTRALESIONAL; INTRAMUSCULAR; INTRAVENOUS; SOFT TISSUE PRN
Status: DISCONTINUED | OUTPATIENT
Start: 2019-05-15 | End: 2019-05-15 | Stop reason: SDUPTHER

## 2019-05-15 RX ORDER — ONDANSETRON 2 MG/ML
INJECTION INTRAMUSCULAR; INTRAVENOUS PRN
Status: DISCONTINUED | OUTPATIENT
Start: 2019-05-15 | End: 2019-05-15 | Stop reason: SDUPTHER

## 2019-05-15 RX ORDER — LABETALOL 20 MG/4 ML (5 MG/ML) INTRAVENOUS SYRINGE
5 EVERY 10 MIN PRN
Status: DISCONTINUED | OUTPATIENT
Start: 2019-05-15 | End: 2019-05-15 | Stop reason: HOSPADM

## 2019-05-15 RX ORDER — SODIUM CHLORIDE 0.9 % (FLUSH) 0.9 %
10 SYRINGE (ML) INJECTION PRN
Status: DISCONTINUED | OUTPATIENT
Start: 2019-05-15 | End: 2019-05-15 | Stop reason: HOSPADM

## 2019-05-15 RX ORDER — IBUPROFEN 600 MG/1
600 TABLET ORAL EVERY 6 HOURS PRN
Qty: 30 TABLET | Refills: 1 | Status: SHIPPED | OUTPATIENT
Start: 2019-05-15 | End: 2019-05-23 | Stop reason: ALTCHOICE

## 2019-05-15 RX ORDER — DOCUSATE SODIUM 100 MG/1
100 CAPSULE, LIQUID FILLED ORAL 2 TIMES DAILY PRN
Qty: 60 CAPSULE | Refills: 0 | Status: SHIPPED | OUTPATIENT
Start: 2019-05-15 | End: 2019-09-26

## 2019-05-15 RX ORDER — LIDOCAINE HYDROCHLORIDE 10 MG/ML
1 INJECTION, SOLUTION EPIDURAL; INFILTRATION; INTRACAUDAL; PERINEURAL
Status: DISCONTINUED | OUTPATIENT
Start: 2019-05-15 | End: 2019-05-15 | Stop reason: HOSPADM

## 2019-05-15 RX ORDER — MEPERIDINE HYDROCHLORIDE 50 MG/ML
12.5 INJECTION INTRAMUSCULAR; INTRAVENOUS; SUBCUTANEOUS EVERY 5 MIN PRN
Status: DISCONTINUED | OUTPATIENT
Start: 2019-05-15 | End: 2019-05-15 | Stop reason: HOSPADM

## 2019-05-15 RX ORDER — GLYCOPYRROLATE 1 MG/5 ML
SYRINGE (ML) INTRAVENOUS PRN
Status: DISCONTINUED | OUTPATIENT
Start: 2019-05-15 | End: 2019-05-15 | Stop reason: SDUPTHER

## 2019-05-15 RX ORDER — FENTANYL CITRATE 50 UG/ML
INJECTION, SOLUTION INTRAMUSCULAR; INTRAVENOUS PRN
Status: DISCONTINUED | OUTPATIENT
Start: 2019-05-15 | End: 2019-05-15 | Stop reason: SDUPTHER

## 2019-05-15 RX ORDER — MORPHINE SULFATE 2 MG/ML
2 INJECTION, SOLUTION INTRAMUSCULAR; INTRAVENOUS EVERY 5 MIN PRN
Status: DISCONTINUED | OUTPATIENT
Start: 2019-05-15 | End: 2019-05-15 | Stop reason: HOSPADM

## 2019-05-15 RX ADMIN — Medication 0.4 MG: at 08:52

## 2019-05-15 RX ADMIN — FENTANYL CITRATE 50 MCG: 50 INJECTION, SOLUTION INTRAMUSCULAR; INTRAVENOUS at 09:22

## 2019-05-15 RX ADMIN — PROPOFOL 200 MG: 10 INJECTION, EMULSION INTRAVENOUS at 08:00

## 2019-05-15 RX ADMIN — SODIUM CHLORIDE, POTASSIUM CHLORIDE, SODIUM LACTATE AND CALCIUM CHLORIDE: 600; 310; 30; 20 INJECTION, SOLUTION INTRAVENOUS at 07:29

## 2019-05-15 RX ADMIN — FENTANYL CITRATE 50 MCG: 50 INJECTION, SOLUTION INTRAMUSCULAR; INTRAVENOUS at 07:55

## 2019-05-15 RX ADMIN — ROCURONIUM BROMIDE 30 MG: 10 INJECTION INTRAVENOUS at 08:10

## 2019-05-15 RX ADMIN — ROCURONIUM BROMIDE 10 MG: 10 INJECTION INTRAVENOUS at 08:24

## 2019-05-15 RX ADMIN — DEXAMETHASONE SODIUM PHOSPHATE 4 MG: 4 INJECTION, SOLUTION INTRAMUSCULAR; INTRAVENOUS at 08:08

## 2019-05-15 RX ADMIN — KETOROLAC TROMETHAMINE 30 MG: 30 INJECTION, SOLUTION INTRAMUSCULAR at 08:50

## 2019-05-15 RX ADMIN — SODIUM CHLORIDE, POTASSIUM CHLORIDE, SODIUM LACTATE AND CALCIUM CHLORIDE: 600; 310; 30; 20 INJECTION, SOLUTION INTRAVENOUS at 08:30

## 2019-05-15 RX ADMIN — FENTANYL CITRATE 50 MCG: 50 INJECTION, SOLUTION INTRAMUSCULAR; INTRAVENOUS at 08:53

## 2019-05-15 RX ADMIN — FENTANYL CITRATE 50 MCG: 50 INJECTION, SOLUTION INTRAMUSCULAR; INTRAVENOUS at 08:29

## 2019-05-15 RX ADMIN — Medication 15 MG: at 09:08

## 2019-05-15 RX ADMIN — MIDAZOLAM 2 MG: 1 INJECTION INTRAMUSCULAR; INTRAVENOUS at 07:55

## 2019-05-15 RX ADMIN — Medication 3 MG: at 08:52

## 2019-05-15 RX ADMIN — Medication 15 MG: at 08:58

## 2019-05-15 RX ADMIN — LIDOCAINE HYDROCHLORIDE 100 MG: 20 INJECTION, SOLUTION EPIDURAL; INFILTRATION; INTRACAUDAL at 08:00

## 2019-05-15 RX ADMIN — SODIUM CHLORIDE, POTASSIUM CHLORIDE, SODIUM LACTATE AND CALCIUM CHLORIDE: 600; 310; 30; 20 INJECTION, SOLUTION INTRAVENOUS at 07:15

## 2019-05-15 RX ADMIN — ONDANSETRON 4 MG: 2 INJECTION INTRAMUSCULAR; INTRAVENOUS at 08:43

## 2019-05-15 RX ADMIN — SUCCINYLCHOLINE CHLORIDE 100 MG: 20 INJECTION, SOLUTION INTRAMUSCULAR; INTRAVENOUS at 08:04

## 2019-05-15 RX ADMIN — Medication 0.2 MG: at 08:58

## 2019-05-15 RX ADMIN — Medication 10 MG: at 08:17

## 2019-05-15 RX ADMIN — Medication 10 MG: at 08:13

## 2019-05-15 ASSESSMENT — LIFESTYLE VARIABLES: SMOKING_STATUS: 0

## 2019-05-15 ASSESSMENT — PULMONARY FUNCTION TESTS
PIF_VALUE: 18
PIF_VALUE: 15
PIF_VALUE: 21
PIF_VALUE: 15
PIF_VALUE: 2
PIF_VALUE: 20
PIF_VALUE: 19
PIF_VALUE: 17
PIF_VALUE: 18
PIF_VALUE: 20
PIF_VALUE: 21
PIF_VALUE: 17
PIF_VALUE: 21
PIF_VALUE: 17
PIF_VALUE: 25
PIF_VALUE: 21
PIF_VALUE: 20
PIF_VALUE: 21
PIF_VALUE: 21
PIF_VALUE: 19
PIF_VALUE: 18
PIF_VALUE: 20
PIF_VALUE: 17
PIF_VALUE: 19
PIF_VALUE: 17
PIF_VALUE: 24
PIF_VALUE: 17
PIF_VALUE: 18
PIF_VALUE: 4
PIF_VALUE: 29
PIF_VALUE: 21
PIF_VALUE: 4
PIF_VALUE: 1
PIF_VALUE: 18
PIF_VALUE: 17
PIF_VALUE: 19
PIF_VALUE: 20
PIF_VALUE: 17
PIF_VALUE: 19
PIF_VALUE: 20
PIF_VALUE: 28
PIF_VALUE: 21
PIF_VALUE: 18
PIF_VALUE: 17
PIF_VALUE: 17
PIF_VALUE: 20
PIF_VALUE: 18
PIF_VALUE: 17
PIF_VALUE: 20
PIF_VALUE: 18
PIF_VALUE: 15
PIF_VALUE: 17
PIF_VALUE: 1
PIF_VALUE: 19
PIF_VALUE: 20
PIF_VALUE: 3
PIF_VALUE: 17
PIF_VALUE: 1
PIF_VALUE: 19
PIF_VALUE: 3
PIF_VALUE: 0
PIF_VALUE: 18
PIF_VALUE: 18
PIF_VALUE: 20
PIF_VALUE: 18
PIF_VALUE: 21
PIF_VALUE: 21
PIF_VALUE: 19
PIF_VALUE: 18
PIF_VALUE: 1
PIF_VALUE: 0
PIF_VALUE: 1
PIF_VALUE: 1
PIF_VALUE: 20
PIF_VALUE: 20
PIF_VALUE: 28
PIF_VALUE: 18
PIF_VALUE: 17
PIF_VALUE: 21
PIF_VALUE: 17
PIF_VALUE: 17
PIF_VALUE: 19

## 2019-05-15 ASSESSMENT — ENCOUNTER SYMPTOMS
SHORTNESS OF BREATH: 0
STRIDOR: 0

## 2019-05-15 ASSESSMENT — PAIN SCALES - GENERAL
PAINLEVEL_OUTOF10: 0
PAINLEVEL_OUTOF10: 0

## 2019-05-15 ASSESSMENT — PAIN - FUNCTIONAL ASSESSMENT: PAIN_FUNCTIONAL_ASSESSMENT: 0-10

## 2019-05-15 NOTE — ANESTHESIA POSTPROCEDURE EVALUATION
Department of Anesthesiology  Postprocedure Note    Patient: Lyric Thapa  MRN: [id number]  YOB: 1975  Date of evaluation: 5/15/2019  Time:  1:01 PM     Procedure Summary     Date:  05/15/19 Room / Location:  [address] [place] [address] Markos Gonsalez 06 / [address]    Anesthesia Start:  1152 Anesthesia Stop:  3613    Procedures:       HYSTEROSCOPY, FAILED ENDOMETRIAL ABLATION W/NOVASURE (N/A )      DIAGNOSTIC LAPAROSCOPY (N/A Abdomen) Diagnosis:  (ENLARGED UTERUS  HEAVY MENSES  MEDICALLY INDICATED TUBAL)    Surgeon:  Nusrat Beckford DO Responsible Provider:  Maria Fernanda Navas MD    Anesthesia Type:  general ASA Status:  2          Anesthesia Type: general    Brian Phase I: Brian Score: 10    Brian Phase II: Brian Score: 10    Last vitals: Reviewed and per EMR flowsheets.        Anesthesia Post Evaluation    Comments: POST- ANESTHESIA EVALUATION       Pt Name: Lyric Thapa  MRN: [id number]  YOB: 1975  Date of evaluation: 5/15/2019  Time:  1:01 PM      /63   Pulse 83   Temp 98.4 °F (36.9 °C) (Infrared)   Resp 16   Ht 5' 5\" (1.651 m)   Wt 225 lb 4 oz (102.2 kg)   LMP 04/29/2019 (Exact Date)   SpO2 93%   BMI 37.48 kg/m²      Consciousness Level  Awake  Cardiopulmonary Status  Stable  Pain Adequately Treated YES  Nausea / Vomiting  NO  Adequate Hydration  YES  Anesthesia Related Complications NONE      Electronically signed by Maria Fernanda Navas MD on 5/15/2019 at 1:01 PM

## 2019-05-15 NOTE — OP NOTE
Sound: 13 cm  Net Length: 6.5 cm  Width: 4.7 cm  Power: na rose  Time: na seconds  FAILED CO2 TEST X 2    Description of Procedure: (Understanding of limitations from template op-reports exist)  The patient was seen in the pre-operative area. The procedure risk and complications were reviewed. The consent , labs , and H&P were reviewed. The patient had all of her questions answered. The patient was moved to the operative suite where she was placed under general anesthesia by the anesthesiologist.  Time out was preformed. The patient was placed in the dorsal lithotomy position utilizing yellow fin Stirrups. The Positioning was checked without stress or pressure on any joints. Her bladder was drained with a wren catheter. She was prepped and draped in sterile fashion. A sponge stick was placed into the vagina. Gloves were then changed and attention was turned to the abdomen. An elliptical infraumbilical skin incision was made. The tissue was dissected to the level of the fascia. Using \"S\" retractors the fascia was visualized and grasped with kocher's x 2. The fascia was tented and then incised and tagged with two \"O\" vicryl lateral STAY sutures. Digital dissection was utilized to enter the peritoneal cavity, the \"S\" retractors were re-approximated. Utilizing a blunt tipped Arango trochar #10, this was introduced and secured. CO2 gas was used to create a pneumoperitoneum to 12 mm hg. Trendelenburg was produced. The laparoscope was placed and underlying structures were noted to be without trauma. Visualization of the pelvis was completed with the above findings noted. Due to the severe adhesive disease throughout the pelvis and the left adnexa there was no entry site for the lower port. Decision was made not to place the filshie clips. This was discussed with the pt pre-operatively and she instructed us to proceed with the ablation as her  had a vasectomy.    All instruments and excess gas was removed from the abdomen and the trocar was removed without difficulty. A single finger noted no omentum or small bowel in the infraumbilical site. The fascia was closed by cross tying the STAY sutures. It was checked for any defects and there were not any. Incisions were injected with 0.5 % marcaine without epinephrine for further analgesia and then closed with 4-0 vicryl in a subcuticular fashion and dressed with steri-strips and TinCoBen and a sterile dressing. A weighted speculum was placed along the posterior vaginal wall and the anterior lip of the cervix was visualized and grasped with a double tooth tenaculum. The cervix and uterine length were obtained, see above, with the sound and dilator. The cervix was dilated slightly using hegar dilators to a # 7 and the hysteroscope was placed with the above findings, Intra-Operative video was completed . The Novasure ablation wand was placed into the uterus and seated. Measurements were taken after and the wand was opened. Cavity assessment was undergone and FAILED. The measurements were at the top end of the Novasure wand. The hysteroscope was re-inserted and confirmation of an intact uterus with adequate distention, vidueo was completed. A second attempt was made to cycle the generator and the CO2 test failed again. The angle was severe due to the adhesive dz and the cavity was large. K-Y Gel was applied to both attempts. Confirmation again of the intact cavit was completed as it was done initially. The procedure was then abated. The double tooth tenaculum was removed along with the Sim's retractor and there was noted to be adequate hemostasis. The patient was awoken from anesthesia. The patient tolerated the procedure well and was taken to PACU in stable condition. All sponge, needle and instrument counts were called for and found to be correct x 3. Disposition:  The patient will return to my office in 2 weeks.    She was counseled with her family that she is to report any temperature more than 100.4 F, pelvic pain, or heavy vaginal bleeding; She is to refrain from intercourse douching or tampons; No hot tubs baths lakes or pools. The patient voiced understanding of the above counseling. I will review the intra-operative video. I will  the patient on her options both medical and surgical, conservative and definitive. If she elects hysterectomy then an Open technique pfannenstiel entry. Bowel prep and ureteral stents are indicated. (SANTOS-USO or BSO) +/- Enterocele with extensive JERICA. Fulguration of endometriosis. Possible suppressive therapy if the pt elects to leave an Ovary. Risk of second surgery if ovary is left intact.                   Jimmy Doherty 05/15/19 9:24 AM

## 2019-05-15 NOTE — INTERVAL H&P NOTE
PRE OP NOTE  Gyn Surgery    [address] [facility]  Gynecologic Surgery  PHYSICIAN PRE-OPERATIVE NOTE:      Patient Name: Dulce Maria Davila  Patient : 1975  Room/Bed: 250 [facility] OR Pool/NONE  Admission Date/Time: 5/15/2019  5:46 AM  Primary Care Physician: ANGELA Reed CNP  MRN #: [id number]  [facility] #: [id number]        Date: 5/15/2019  Time: 7:36 AM    The patient was seen in pre-op holding. The procedure risks and complications were reviewed. The labs, Consent, and H&P were reviewed and updated. The patient was counseled on the possibility of  the need of a second surgery. The patient voiced understanding and had all of her questions answered. The possibility of incomplete removal of abnormal tissue was discussed. HPI: Dulce Maria Davila is a 37 y.o. female   Pt with abnormal heavy painful menses. She states they are affecting ADL's. Her  has had a vasectomy but pt wishes the meduically indicated tubal interruption. She initially wanted the tubes removed but due to her tattoo now wishes interruption with Filshie clip application. She had an abnormal pap and completed colposcopy with ECC. She completed an endometrial sampling. She states the symptoms are affecting both parental and work ADL's. She has failed NSAIDs, Depo Provera, OCP's. She wishes conservative surgical management. She is aware of medical and surgical alternative options as reviewed with her. She is demanding her fallopian tubes be interrupted eventhough her  has had a vasectomy RB reviewed.             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 ([facility] [address] 2018       Patient Active Problem List    Diagnosis Date Noted    Dysmenorrhea 2019     Priority: High    Hx of abdominoplasty 2019     Priority: High    Hx of  section x 1 LTC 2019     Priority: Medium    Chronic cough 10/15/2018    Chronic fatigue 10/15/2018    Menorrhagia with regular cycle 10/15/2018    Smoker 10/15/2018    Microscopic hematuria 2018    Acute pain of right knee 10/23/2017    Right forearm pain 10/23/2017    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 ([facility] [address] 10/18/2017    Family history of factor V Leiden mutation (daughter) Pt is negative 10/02/2017    History of migraine headaches     Mixed hyperlipidemia     Class 2 severe obesity due to excess calories with serious comorbidity and body mass index (BMI) of 39.0 to 39.9 in adult [facility]) 2015    Essential hypertension 2014         Past Surgical History:   Procedure Laterality Date    ABDOMEN SURGERY      tummy tuck    BACK SURGERY  2004    BREAST SURGERY  2012    lift     SECTION      COLPOSCOPY  2019    CYSTO/URETERO/PYELOSCOPY, CALCULUS TX N/A 2017    CYSTOSCOPY LEFT URETEROSCOPY HOLMIUM LASER, LITHOTRIPSY, LEFT STENT PLACEMENT performed by Thais Moon MD at [address] [place] [address]  2017    ureteroscopy, stent, holmium laser    FOOT SURGERY Right 2010    HEMORRHOID SURGERY  2015    removal    TONSILLECTOMY         Family History   Problem Relation Age of Onset    Diabetes Father     High Blood Pressure Father     Heart Disease Father     Diabetes Sister     Diabetes Brother     Diabetes Daughter        Social History     Socioeconomic History    Marital status:      Spouse name: Not on file    Number of children: Not on file    Years of education: Not on file    Highest education level: Not on file   Occupational History    Not on file   Social Needs    Financial resource strain: Not on file    Food insecurity:     Worry: Not on file     Inability: Not on file    Transportation needs:     Medical: Not on file     Non-medical: Not on file   Tobacco Use    Smoking status: Current Every Day Smoker     Types: Cigarettes    Smokeless tobacco: Never Used    Tobacco risk of development Primary Peritoneal/Ovarian cancer. She was informed that large scale studies have not shown an increase in the estimated blood loss, complications, or operating time. The patient was made aware that bleeding, infection, and injury to nearby organs are potential complications with this additional surgery. The patient was also informed and had this reviewed in detail with her that once the risk reduction procedure is completed she will have lost the fertility opportunity, to conceive naturally, going forward and that any future conception would require reproductive assisted approaches; (IVF, GIFT,ZIFT)-all described in lay terms to the patient. The patient was counseled and understands that the loss of fertility can not be reversed. She voiced understanding of this and signed a written consent. She was also counseled that any future pregnancy (18- cases annually), carries an added increase risk of ectopic pregnancy and the potential need for future surgery. The patient still wishes to proceed with the risk reduction surgery. The patient had explained to her that the completion of the procedure does not guarantee her that she will not be diagnosed with a future primary peritoneal or ovarian malignancy but her risks are greatly reduced.             Assessment:       Diagnosis Orders   1. Dysmenorrhea      2. Menorrhagia with regular cycle      3. Enlarged uterus      4. Hx of  section      5.  Hx of abdominoplasty                  Patient Active Problem List     Diagnosis Date Noted    Dysmenorrhea 2019       Priority: High    Menorrhagia with regular cycle 10/15/2018       Priority: High    Essential hypertension 2014       Priority: High    Hx of  section x 1 LTC 2019       Priority: Medium    Hx of abdominoplasty 2019       Priority: Medium    Smoker 10/15/2018       Priority: Medium    Homozygous MTHFR mutation [id number] ([facility] 10/18/2017       Priority: Medium    Family history of factor V Leiden mutation (daughter) Pt is negative 10/02/2017       Priority: Medium    Chronic cough 10/15/2018    Chronic fatigue 10/15/2018    Microscopic hematuria 05/23/2018    Acute pain of right knee 10/23/2017    Right forearm pain 10/23/2017    History of migraine headaches      Mixed hyperlipidemia      Class 2 severe obesity due to excess calories with serious comorbidity and body mass index (BMI) of 39.0 to 39.9 in adult [facility]) 05/27/2015                    Procedure Planned:  1. Laparoscopic tubal interruption (Medically Indicated)-Filshie clips  2. Hysteroscopic Novasure Endometrial Ablation     No orders of the defined types were placed in this encounter.           Counseling: The patient was counseled on all options both medical and surgical, conservative as well as definitive. She has elected to proceed with the procedure as stated above.     The patient was counseled on the procedure. Risks and complications were reviewed in detail. The patients orders, labs, consents have been completed. The history and physical as well as all supporting surgical documentation will be forwarded to the pre-operative holding area.     The patient is aware that this procedure may not alleviate her symptoms. That there may be a necessity for a second surgery and that there may be an incomplete removal of abnormal tissue.        The patients that are undergoing a procedure for family planning WERE INSTRUCTED THAT THE PROCEDURE IS PERMANENT AND NON REVERSIBLE. FAILURE RATES OF 18-20/1000 CASES WERE REVIEWED AS WELL AS ALL ALTERNATE REVERSIBLE FORMS OF BIRTH CONTROL MALE AND FEMALE. THEY WERE COUNSELED THAT A FAILURE WOULD MOST LIKELY END UP IN AN ECTOPIC PREGNANCY, A PREGNANCY OUTSIDE OF THE UTERUS MOST COMMONLY IN THE FALLOPIAN TUBE, NECESSITATING FURTHER SURGERY, A BLOOD TRANSFUSION OR BOTH.  POST TUBAL STERILIZATION SYNDROME WAS ALSO DISCUSSED WITH THE PATIENT AT 05/08/2019 84  ms Final    Q-T Interval 05/08/2019 396  ms Final    QTc Calculation (Bazett) 05/08/2019 415  ms Final    P Axis 05/08/2019 37  degrees Final    R Axis 05/08/2019 41  degrees Final    T Axis 05/08/2019 52  degrees Final    WBC 05/08/2019 6.7  3.5 - 11.0 k/uL Final    RBC 05/08/2019 5.04  4.0 - 5.2 m/uL Final    Hemoglobin 05/08/2019 15.0  12.0 - 16.0 g/dL Final    Hematocrit 05/08/2019 43.8  36 - 46 % Final    MCV 05/08/2019 86.9  80 - 100 fL Final    MCH 05/08/2019 29.8  26 - 34 pg Final    MCHC 05/08/2019 34.3  31 - 37 g/dL Final    RDW 05/08/2019 13.2  11.5 - 14.9 % Final    Platelets 48/38/5921 298  150 - 450 k/uL Final    MPV 05/08/2019 9.1  6.0 - 12.0 fL Final    NRBC Automated 05/08/2019 NOT REPORTED  per 100 WBC Final    Differential Type 05/08/2019 NOT REPORTED   Final    Seg Neutrophils 05/08/2019 67* 36 - 66 % Final    Lymphocytes 05/08/2019 26  24 - 44 % Final    Monocytes 05/08/2019 6  1 - 7 % Final    Eosinophils % 05/08/2019 0  0 - 4 % Final    Basophils 05/08/2019 1  0 - 2 % Final    Immature Granulocytes 05/08/2019 NOT REPORTED  0 % Final    Segs Absolute 05/08/2019 4.40  1.3 - 9.1 k/uL Final    Absolute Lymph # 05/08/2019 1.80  1.0 - 4.8 k/uL Final    Absolute Mono # 05/08/2019 0.40  0.1 - 1.3 k/uL Final    Absolute Eos # 05/08/2019 0.00  0.0 - 0.4 k/uL Final    Basophils # 05/08/2019 0.00  0.0 - 0.2 k/uL Final    Absolute Immature Granulocyte 05/08/2019 NOT REPORTED  0.00 - 0.30 k/uL Final    WBC Morphology 05/08/2019 NOT REPORTED   Final    RBC Morphology 05/08/2019 NOT REPORTED   Final    Platelet Estimate 10/77/4421 NOT REPORTED   Final    Glucose 05/08/2019 100* 70 - 99 mg/dL Final    BUN 05/08/2019 21* 6 - 20 mg/dL Final    CREATININE 05/08/2019 0.86  0.50 - 0.90 mg/dL Final    Bun/Cre Ratio 05/08/2019 NOT REPORTED  9 - 20 Final    Calcium 05/08/2019 9.9  8.6 - 10.4 mg/dL Final    Sodium 05/08/2019 139  135 - 144 mmol/L Final    Potassium Value Ref Range    hCG Quant <1 <5 IU/L   CBC auto differential   Result Value Ref Range    WBC 6.7 3.5 - 11.0 k/uL    RBC 5.04 4.0 - 5.2 m/uL    Hemoglobin 15.0 12.0 - 16.0 g/dL    Hematocrit 43.8 36 - 46 %    MCV 86.9 80 - 100 fL    MCH 29.8 26 - 34 pg    MCHC 34.3 31 - 37 g/dL    RDW 13.2 11.5 - 14.9 %    Platelets 254 721 - 007 k/uL    MPV 9.1 6.0 - 12.0 fL    NRBC Automated NOT REPORTED per 100 WBC    Differential Type NOT REPORTED     Seg Neutrophils 67 (H) 36 - 66 %    Lymphocytes 26 24 - 44 %    Monocytes 6 1 - 7 %    Eosinophils % 0 0 - 4 %    Basophils 1 0 - 2 %    Immature Granulocytes NOT REPORTED 0 %    Segs Absolute 4.40 1.3 - 9.1 k/uL    Absolute Lymph # 1.80 1.0 - 4.8 k/uL    Absolute Mono # 0.40 0.1 - 1.3 k/uL    Absolute Eos # 0.00 0.0 - 0.4 k/uL    Basophils # 0.00 0.0 - 0.2 k/uL    Absolute Immature Granulocyte NOT REPORTED 0.00 - 0.30 k/uL    WBC Morphology NOT REPORTED     RBC Morphology NOT REPORTED     Platelet Estimate NOT REPORTED    Basic Metabolic Panel w/ Reflex to MG   Result Value Ref Range    Glucose 100 (H) 70 - 99 mg/dL    BUN 21 (H) 6 - 20 mg/dL    CREATININE 0.86 0.50 - 0.90 mg/dL    Bun/Cre Ratio NOT REPORTED 9 - 20    Calcium 9.9 8.6 - 10.4 mg/dL    Sodium 139 135 - 144 mmol/L    Potassium 4.6 3.7 - 5.3 mmol/L    Chloride 102 98 - 107 mmol/L    CO2 26 20 - 31 mmol/L    Anion Gap 11 9 - 17 mmol/L    GFR Non-African American >60 >60 mL/min    GFR African American >60 >60 mL/min    GFR Comment          GFR Staging NOT REPORTED    Protime-INR   Result Value Ref Range    Protime 12.5 11.8 - 14.6 sec    INR 0.9    APTT   Result Value Ref Range    PTT 25.7 24.0 - 36.0 sec   EKG 12 lead   Result Value Ref Range    Ventricular Rate 66 BPM    Atrial Rate 66 BPM    P-R Interval 132 ms    QRS Duration 84 ms    Q-T Interval 396 ms    QTc Calculation (Bazett) 415 ms    P Axis 37 degrees    R Axis 41 degrees    T Axis 52 degrees   TYPE AND SCREEN   Result Value Ref Range    Expiration Date 05/18/2019     Arm Band Number [id number]     ABO/Rh O POSITIVE     Antibody Screen NEGATIVE     Blood Bank Comment       Pt's ABRh confirmed as O POS:003   Results Verified             The patient is ready for transport to the operative suite.       Electronically signed by Mohan Kinsey on 5/15/2019 at 7:36 AM

## 2019-05-15 NOTE — ANESTHESIA PRE PROCEDURE
BP: 130/86   Pulse: 65   Resp: 18   Temp: 97.5 °F (36.4 °C)   TempSrc: Oral   SpO2: 98%   Weight: 225 lb 4 oz (102.2 kg)   Height: 5' 5\" (1.651 m)                                              BP Readings from Last 3 Encounters:   05/15/19 130/86   05/08/19 (!) 145/85   04/30/19 138/84       NPO Status: Time of last liquid consumption: 2000                        Time of last solid consumption: 2000                        Date of last liquid consumption: 05/14/19                        Date of last solid food consumption: 05/14/19    BMI:   Wt Readings from Last 3 Encounters:   05/15/19 225 lb 4 oz (102.2 kg)   05/08/19 225 lb 4.8 oz (102.2 kg)   04/30/19 228 lb (103.4 kg)     Body mass index is 37.48 kg/m². CBC:   Lab Results   Component Value Date    WBC 6.7 05/08/2019    RBC 5.04 05/08/2019    HGB 15.0 05/08/2019    HCT 43.8 05/08/2019    MCV 86.9 05/08/2019    RDW 13.2 05/08/2019     05/08/2019     LR    CMP:   Lab Results   Component Value Date     05/08/2019    K 4.6 05/08/2019     05/08/2019    CO2 26 05/08/2019    BUN 21 05/08/2019    CREATININE 0.86 05/08/2019    GFRAA >60 05/08/2019    LABGLOM >60 05/08/2019    GLUCOSE 100 05/08/2019    CALCIUM 9.9 05/08/2019    BILITOT 0.8 02/16/2013    ALKPHOS 95 02/16/2013    AST 25 02/16/2013    ALT 35 02/16/2013       POC Tests: No results for input(s): POCGLU, POCNA, POCK, POCCL, POCBUN, POCHEMO, POCHCT in the last 72 hours.     Coags:   Lab Results   Component Value Date    PROTIME 12.5 05/08/2019    INR 0.9 05/08/2019    APTT 25.7 05/08/2019       HCG (If Applicable):   Lab Results   Component Value Date    PREGTESTUR negative 04/11/2019    HCG NEGATIVE 05/24/2017    HCGQUANT <1 05/08/2019        ABGs: No results found for: PHART, PO2ART, WHI8SBF, QGK4ZIB, BEART, R6JILXDF     Type & Screen (If Applicable):  No results found for: LABABO, [address]    Anesthesia Evaluation  Patient summary reviewed no history of anesthetic complications:   Airway: Mallampati: II  TM distance: >3 FB   Neck ROM: full  Mouth opening: > = 3 FB Dental: normal exam         Pulmonary:Negative Pulmonary ROS and normal exam  breath sounds clear to auscultation      (-) pneumonia, COPD, asthma, shortness of breath, recent URI, sleep apnea, rhonchi, wheezes, rales, stridor and not a current smoker          Patient smoked on day of surgery. Cardiovascular:  Exercise tolerance: good (>4 METS),   (+) hypertension: no interval change,     (-) pacemaker, valvular problems/murmurs, past MI, CAD, CABG/stent, dysrhythmias,  angina,  CHF, orthopnea, PND,  STEPHENS, murmur, weak pulses,  friction rub, systolic click, carotid bruit,  JVD and peripheral edema    ECG reviewed  Rhythm: regular  Rate: normal           Beta Blocker:  Dose within 24 Hrs         Neuro/Psych:   Negative Neuro/Psych ROS     (-) seizures, neuromuscular disease, TIA, CVA, headaches, psychiatric history and depression/anxiety            GI/Hepatic/Renal: Neg GI/Hepatic/Renal ROS       (-) hiatal hernia, GERD, PUD, hepatitis, liver disease, no renal disease, bowel prep and no morbid obesity       Endo/Other: Negative Endo/Other ROS   (+) no malignancy/cancer. (-) diabetes mellitus, hypothyroidism, hyperthyroidism, blood dyscrasia, arthritis, no electrolyte abnormalities, no malignancy/cancer               Abdominal:           Vascular:     - PVD, DVT and PE. Anesthesia Plan      general     ASA 2       Induction: intravenous. MIPS: Postoperative opioids intended and Prophylactic antiemetics administered. Anesthetic plan and risks discussed with patient. Plan discussed with CRNA.                   Gloria Haas MD   5/15/2019

## 2019-05-15 NOTE — TELEPHONE ENCOUNTER
Yes if she has no history personal or family of blood clots and does not smoke.   Needs to sign an HRT consent

## 2019-05-15 NOTE — TELEPHONE ENCOUNTER
Pt was scheduled for ablasion with tubal today 05/15 , it was a failed surgery , Dr. [place] Collier recommended pt to have a hyst , she wishes not to at this time do to work issues, pt is asking if she could be on Depo again to stop her periods. Please call her and let her know.

## 2019-05-15 NOTE — H&P
Gynecologic Surgery Pre-Operative Attestation Note:      Hospital: [facility]  Date: 5/15/2019  Time: 7:47 AM      Patient Name: More Barnes  Patient : 1975  Room/Bed: [facility] OR Pool/NONE  Admission Date/Time: 5/15/2019  5:46 AM  MRN #: [id number]  [facility] #: [id number]          Attending Physician Statement  I have discussed the care of More Barnes, including pertinent history and exam findings,  with the resident. I have reviewed their note in the electronic medical record. I have seen and examined the patient in the pre-op holding area and the key elements of all parts of the encounter have been performed/reviewed by me . I agree with the assessment, plan and orders as documented by the resident. The procedure risks and complications were discussed as well as the possibility of the need for a second surgery and incomplete removal of abnormal tissue. Vitals:    05/15/19 0704   BP: 130/86   Pulse: 65   Resp: 18   Temp: 97.5 °F (36.4 °C)   TempSrc: Oral   SpO2: 98%   Weight: 225 lb 4 oz (102.2 kg)   Height: 5' 5\" (1.651 m)         Admission on 05/15/2019   Component Date Value Ref Range Status    HCG, Pregnancy Urine (POC) 05/15/2019 NEGATIVE  NEGATIVE Final    - 05/15/2019 NOT REPORTED   Final   Hospital Outpatient Visit on 2019   Component Date Value Ref Range Status    Specimen Description 2019 . CLEAN CATCH URINE   Final    Special Requests 2019 NOT REPORTED   Final    Culture 2019 NO SIGNIFICANT GROWTH   Final    Color, UA 2019 YELLOW  YELLOW Final    Turbidity UA 2019 CLEAR  CLEAR Final    Glucose, Ur 2019 NEGATIVE  NEGATIVE Final    Bilirubin Urine 2019 NEGATIVE  NEGATIVE Final    Ketones, Urine 2019 NEGATIVE  NEGATIVE Final    Specific Gravity, UA 2019 1.023  1.000 - 1.030 Final    Urine Hgb 2019 NEGATIVE  NEGATIVE Final    pH, UA 2019 6.0  5.0 - 8.0 Final    Protein, UA 2019 NEGATIVE NEGATIVE Final    Urobilinogen, Urine 05/08/2019 Normal  Normal Final    Nitrite, Urine 05/08/2019 NEGATIVE  NEGATIVE Final    Leukocyte Esterase, Urine 05/08/2019 NEGATIVE  NEGATIVE Final    Urinalysis Comments 05/08/2019 Microscopic exam not performed based on chemical results unless requested in original order. Final    Expiration Date 05/08/2019 05/18/2019   Final    Arm Band Number 05/08/2019 [id number]   Final    ABO/Rh 05/08/2019 O POSITIVE   Final    Antibody Screen 05/08/2019 NEGATIVE   Final    Blood Bank Comment 05/08/2019    Final                    Value:Pt's ABRh confirmed as O POS:003   Results Verified      hCG Quant 05/08/2019 <1  <5 IU/L Final    Comment:    Non-preg premeno   <=5  Postmeno           <=8  Male               <=3  If HCG results do not concur with clinical observations, additional testing to confirm   results is recommended. Elevated results not associated with pregnancy may be found in patients with other diseases   such as tumors of the germ cells (testis, ovaries, etc.), bladder, pancreas, stomach, lungs,   and liver.       Ventricular Rate 05/08/2019 66  BPM Final    Atrial Rate 05/08/2019 66  BPM Final    P-R Interval 05/08/2019 132  ms Final    QRS Duration 05/08/2019 84  ms Final    Q-T Interval 05/08/2019 396  ms Final    QTc Calculation (Bazett) 05/08/2019 415  ms Final    P Axis 05/08/2019 37  degrees Final    R Axis 05/08/2019 41  degrees Final    T Axis 05/08/2019 52  degrees Final    WBC 05/08/2019 6.7  3.5 - 11.0 k/uL Final    RBC 05/08/2019 5.04  4.0 - 5.2 m/uL Final    Hemoglobin 05/08/2019 15.0  12.0 - 16.0 g/dL Final    Hematocrit 05/08/2019 43.8  36 - 46 % Final    MCV 05/08/2019 86.9  80 - 100 fL Final    MCH 05/08/2019 29.8  26 - 34 pg Final    MCHC 05/08/2019 34.3  31 - 37 g/dL Final    RDW 05/08/2019 13.2  11.5 - 14.9 % Final    Platelets 69/77/6293 298  150 - 450 k/uL Final    MPV 05/08/2019 9.1  6.0 - 12.0 fL Final    NRBC Automated 05/08/2019 NOT REPORTED  per 100 WBC Final    Differential Type 05/08/2019 NOT REPORTED   Final    Seg Neutrophils 05/08/2019 67* 36 - 66 % Final    Lymphocytes 05/08/2019 26  24 - 44 % Final    Monocytes 05/08/2019 6  1 - 7 % Final    Eosinophils % 05/08/2019 0  0 - 4 % Final    Basophils 05/08/2019 1  0 - 2 % Final    Immature Granulocytes 05/08/2019 NOT REPORTED  0 % Final    Segs Absolute 05/08/2019 4.40  1.3 - 9.1 k/uL Final    Absolute Lymph # 05/08/2019 1.80  1.0 - 4.8 k/uL Final    Absolute Mono # 05/08/2019 0.40  0.1 - 1.3 k/uL Final    Absolute Eos # 05/08/2019 0.00  0.0 - 0.4 k/uL Final    Basophils # 05/08/2019 0.00  0.0 - 0.2 k/uL Final    Absolute Immature Granulocyte 05/08/2019 NOT REPORTED  0.00 - 0.30 k/uL Final    WBC Morphology 05/08/2019 NOT REPORTED   Final    RBC Morphology 05/08/2019 NOT REPORTED   Final    Platelet Estimate 13/95/8612 NOT REPORTED   Final    Glucose 05/08/2019 100* 70 - 99 mg/dL Final    BUN 05/08/2019 21* 6 - 20 mg/dL Final    CREATININE 05/08/2019 0.86  0.50 - 0.90 mg/dL Final    Bun/Cre Ratio 05/08/2019 NOT REPORTED  9 - 20 Final    Calcium 05/08/2019 9.9  8.6 - 10.4 mg/dL Final    Sodium 05/08/2019 139  135 - 144 mmol/L Final    Potassium 05/08/2019 4.6  3.7 - 5.3 mmol/L Final    Chloride 05/08/2019 102  98 - 107 mmol/L Final    CO2 05/08/2019 26  20 - 31 mmol/L Final    Anion Gap 05/08/2019 11  9 - 17 mmol/L Final    GFR Non- 05/08/2019 >60  >60 mL/min Final    GFR  05/08/2019 >60  >60 mL/min Final    GFR Comment 05/08/2019        Final    Comment: Average GFR for 38-51 years old:   80 mL/min/1.73sq m  Chronic Kidney Disease:   <60 mL/min/1.73sq m  Kidney failure:   <15 mL/min/1.73sq m              eGFR calculated using average adult body mass.  Additional eGFR calculator available at:        [email or web address]            GFR Staging 05/08/2019 NOT REPORTED   Final    conservative as well as definitive. She has elected to proceed with the procedure as stated above.     The patient was counseled on the procedure. Risks and complications were reviewed in detail. The patients orders, labs, consents have been completed. The history and physical as well as all supporting surgical documentation will be forwarded to the pre-operative holding area.     The patient is aware that this procedure may not alleviate her symptoms. That there may be a necessity for a second surgery and that there may be an incomplete removal of abnormal tissue.        The patients that are undergoing a procedure for family planning WERE INSTRUCTED THAT THE PROCEDURE IS PERMANENT AND NON REVERSIBLE. FAILURE RATES OF 18-20/1000 CASES WERE REVIEWED AS WELL AS ALL ALTERNATE REVERSIBLE FORMS OF BIRTH CONTROL MALE AND FEMALE. THEY WERE COUNSELED THAT A FAILURE WOULD MOST LIKELY END UP IN AN ECTOPIC PREGNANCY, A PREGNANCY OUTSIDE OF THE UTERUS MOST COMMONLY IN THE FALLOPIAN TUBE, NECESSITATING FURTHER SURGERY, A BLOOD TRANSFUSION OR BOTH. POST TUBAL STERILIZATION SYNDROME WAS ALSO DISCUSSED WITH THE PATIENT AT LENGTH.                     Home care, Restrictions & Follow up Care review completed    Planned Office Follow up was reviewed with the patient and her family and is for  2 weeks. The patient will call to make this appointment unless she already has done so.

## 2019-05-23 ENCOUNTER — APPOINTMENT (OUTPATIENT)
Dept: GENERAL RADIOLOGY | Age: 44
End: 2019-05-23
Payer: COMMERCIAL

## 2019-05-23 ENCOUNTER — HOSPITAL ENCOUNTER (EMERGENCY)
Age: 44
Discharge: HOME | End: 2019-05-24
Attending: EMERGENCY MEDICINE
Payer: COMMERCIAL

## 2019-05-23 ENCOUNTER — APPOINTMENT (OUTPATIENT)
Dept: CT IMAGING | Age: 44
End: 2019-05-23
Payer: COMMERCIAL

## 2019-05-23 DIAGNOSIS — R07.9 CHEST PAIN, UNSPECIFIED TYPE: Primary | ICD-10-CM

## 2019-05-23 LAB
ABSOLUTE EOS #: 0.1 K/UL (ref 0–0.4)
ABSOLUTE IMMATURE GRANULOCYTE: NORMAL K/UL (ref 0–0.3)
ABSOLUTE LYMPH #: 2.9 K/UL (ref 1–4.8)
ABSOLUTE MONO #: 0.6 K/UL (ref 0.1–1.3)
ALBUMIN SERPL-MCNC: 4.2 G/DL (ref 3.5–5.2)
ALBUMIN/GLOBULIN RATIO: ABNORMAL (ref 1–2.5)
ALP BLD-CCNC: 132 U/L (ref 35–104)
ALT SERPL-CCNC: 44 U/L (ref 5–33)
ANION GAP SERPL CALCULATED.3IONS-SCNC: 10 MMOL/L (ref 9–17)
AST SERPL-CCNC: 24 U/L
BASOPHILS # BLD: 0 % (ref 0–2)
BASOPHILS ABSOLUTE: 0 K/UL (ref 0–0.2)
BILIRUB SERPL-MCNC: <0.15 MG/DL (ref 0.3–1.2)
BUN BLDV-MCNC: 17 MG/DL (ref 6–20)
BUN/CREAT BLD: ABNORMAL (ref 9–20)
CALCIUM SERPL-MCNC: 9.3 MG/DL (ref 8.6–10.4)
CHLORIDE BLD-SCNC: 107 MMOL/L (ref 98–107)
CO2: 24 MMOL/L (ref 20–31)
CREAT SERPL-MCNC: 0.74 MG/DL (ref 0.5–0.9)
D-DIMER QUANTITATIVE: 0.89 MG/L FEU (ref 0–0.59)
DIFFERENTIAL TYPE: NORMAL
EOSINOPHILS RELATIVE PERCENT: 1 % (ref 0–4)
GFR AFRICAN AMERICAN: >60 ML/MIN
GFR NON-AFRICAN AMERICAN: >60 ML/MIN
GFR SERPL CREATININE-BSD FRML MDRD: ABNORMAL ML/MIN/{1.73_M2}
GFR SERPL CREATININE-BSD FRML MDRD: ABNORMAL ML/MIN/{1.73_M2}
GLUCOSE BLD-MCNC: 126 MG/DL (ref 70–99)
HCG QUALITATIVE: NEGATIVE
HCT VFR BLD CALC: 40.3 % (ref 36–46)
HEMOGLOBIN: 13.6 G/DL (ref 12–16)
IMMATURE GRANULOCYTES: NORMAL %
LYMPHOCYTES # BLD: 30 % (ref 24–44)
MCH RBC QN AUTO: 29.5 PG (ref 26–34)
MCHC RBC AUTO-ENTMCNC: 33.8 G/DL (ref 31–37)
MCV RBC AUTO: 87.4 FL (ref 80–100)
MONOCYTES # BLD: 6 % (ref 1–7)
NRBC AUTOMATED: NORMAL PER 100 WBC
PDW BLD-RTO: 13.3 % (ref 11.5–14.9)
PLATELET # BLD: 261 K/UL (ref 150–450)
PLATELET ESTIMATE: NORMAL
PMV BLD AUTO: 8.8 FL (ref 6–12)
POTASSIUM SERPL-SCNC: 4.1 MMOL/L (ref 3.7–5.3)
RBC # BLD: 4.61 M/UL (ref 4–5.2)
RBC # BLD: NORMAL 10*6/UL
SEG NEUTROPHILS: 63 % (ref 36–66)
SEGMENTED NEUTROPHILS ABSOLUTE COUNT: 5.9 K/UL (ref 1.3–9.1)
SODIUM BLD-SCNC: 141 MMOL/L (ref 135–144)
TOTAL PROTEIN: 7.2 G/DL (ref 6.4–8.3)
TROPONIN INTERP: NORMAL
TROPONIN T: NORMAL NG/ML
TROPONIN, HIGH SENSITIVITY: <6 NG/L (ref 0–14)
WBC # BLD: 9.5 K/UL (ref 3.5–11)
WBC # BLD: NORMAL 10*3/UL

## 2019-05-23 PROCEDURE — 85379 FIBRIN DEGRADATION QUANT: CPT

## 2019-05-23 PROCEDURE — 84703 CHORIONIC GONADOTROPIN ASSAY: CPT

## 2019-05-23 PROCEDURE — 36415 COLL VENOUS BLD VENIPUNCTURE: CPT

## 2019-05-23 PROCEDURE — 84484 ASSAY OF TROPONIN QUANT: CPT

## 2019-05-23 PROCEDURE — 6360000004 HC RX CONTRAST MEDICATION: Performed by: STUDENT IN AN ORGANIZED HEALTH CARE EDUCATION/TRAINING PROGRAM

## 2019-05-23 PROCEDURE — 80053 COMPREHEN METABOLIC PANEL: CPT

## 2019-05-23 PROCEDURE — 99284 EMERGENCY DEPT VISIT MOD MDM: CPT

## 2019-05-23 PROCEDURE — 93005 ELECTROCARDIOGRAM TRACING: CPT | Performed by: STUDENT IN AN ORGANIZED HEALTH CARE EDUCATION/TRAINING PROGRAM

## 2019-05-23 PROCEDURE — 71260 CT THORAX DX C+: CPT

## 2019-05-23 PROCEDURE — 85025 COMPLETE CBC W/AUTO DIFF WBC: CPT

## 2019-05-23 PROCEDURE — 71046 X-RAY EXAM CHEST 2 VIEWS: CPT

## 2019-05-23 PROCEDURE — 2580000003 HC RX 258: Performed by: STUDENT IN AN ORGANIZED HEALTH CARE EDUCATION/TRAINING PROGRAM

## 2019-05-23 RX ORDER — SODIUM CHLORIDE 0.9 % (FLUSH) 0.9 %
10 SYRINGE (ML) INJECTION PRN
Status: DISCONTINUED | OUTPATIENT
Start: 2019-05-23 | End: 2019-05-24 | Stop reason: HOSPADM

## 2019-05-23 RX ORDER — ACETAMINOPHEN 500 MG
1000 TABLET ORAL 3 TIMES DAILY PRN
Qty: 180 TABLET | Refills: 0 | Status: SHIPPED | OUTPATIENT
Start: 2019-05-23

## 2019-05-23 RX ORDER — 0.9 % SODIUM CHLORIDE 0.9 %
80 INTRAVENOUS SOLUTION INTRAVENOUS ONCE
Status: COMPLETED | OUTPATIENT
Start: 2019-05-23 | End: 2019-05-23

## 2019-05-23 RX ORDER — IBUPROFEN 800 MG/1
800 TABLET ORAL EVERY 8 HOURS PRN
Qty: 30 TABLET | Refills: 0 | Status: SHIPPED | OUTPATIENT
Start: 2019-05-23

## 2019-05-23 RX ADMIN — SODIUM CHLORIDE 80 ML: 9 INJECTION, SOLUTION INTRAVENOUS at 23:19

## 2019-05-23 RX ADMIN — IOVERSOL 75 ML: 741 INJECTION INTRA-ARTERIAL; INTRAVENOUS at 23:19

## 2019-05-23 RX ADMIN — Medication 10 ML: at 23:19

## 2019-05-23 ASSESSMENT — PAIN DESCRIPTION - PAIN TYPE: TYPE: ACUTE PAIN

## 2019-05-23 ASSESSMENT — PAIN SCALES - GENERAL: PAINLEVEL_OUTOF10: 6

## 2019-05-23 ASSESSMENT — PAIN DESCRIPTION - ORIENTATION: ORIENTATION: RIGHT

## 2019-05-23 ASSESSMENT — PAIN DESCRIPTION - LOCATION: LOCATION: CHEST

## 2019-05-24 VITALS
BODY MASS INDEX: 37.99 KG/M2 | RESPIRATION RATE: 22 BRPM | TEMPERATURE: 98.5 F | SYSTOLIC BLOOD PRESSURE: 142 MMHG | WEIGHT: 228 LBS | OXYGEN SATURATION: 97 % | DIASTOLIC BLOOD PRESSURE: 82 MMHG | HEART RATE: 85 BPM | HEIGHT: 65 IN

## 2019-05-24 PROCEDURE — 93010 ELECTROCARDIOGRAM REPORT: CPT | Performed by: INTERNAL MEDICINE

## 2019-05-24 NOTE — ED NOTES
Pt presents to ED from home c/o shortness of breath and right sided upper chest pain. Pt stated she recently had a procedure done on 5/15/19 to have an ablation and her tubes tied but it failed and they werent able to do it. Pt stated she has had upper right chest pain and shortness of breath ever since the procedure. Pts lung sounds are clear in all fields. Pt is sinus tach on monitor. Pt is alert and oriented x 4, pwd, gcs=15, call light within reach, pts daughter at bedside.       Elsy Ruvalcaba RN  05/23/19 [id number]

## 2019-05-24 NOTE — ED PROVIDER NOTES
[address]  Emergency Department Encounter  EmergencyMedicine Resident     Pt Paige Hernandes  MRN: [id number]  [place] 1975  Date of evaluation: 19  PCP:  ANGELA Teixeira CNP    CHIEF COMPLAINT       Chief Complaint   Patient presents with    Chest Pain    Shortness of Breath       HISTORY OF PRESENT ILLNESS  (Location/Symptom, Timing/Onset, Context/Setting, Quality, Duration, Modifying Factors, Severity.)      Brenda Patel is a 37 y.o. female who presents with pleuritic right-sided chest pain for the past week. Patient had tubal ligation on 5/15 and reports right-sided back, shoulder, and chest pain since then. She describes her pain as constant pressure like pain. She then had progressively worsening shortness of breath and cough productive of yellow sputum; no hemoptysis. No fevers/chills, nasal congestion, sore throat, arthralgias, nausea or vomiting, abdominal pain, or any other symptoms. No unilateral leg pain or swelling. No history of DVT or pulmonary embolism. No source of exogenous estrogen. Patient states she thought she had pneumonia and began taking amoxicillin which she had on hand at home 2 days ago. When her symptoms persisted today, she presented for evaluation and urgent care. She was noted to have pleuritic chest pain and tachycardia and was sent to the emergency department for pulmonary embolism rule out. PAST MEDICAL / SURGICAL / SOCIAL / FAMILY HISTORY      has a past medical history of History of migraine headaches, Hyperglycemia, Hyperlipidemia, Hypertension, and 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 ([address] has a past surgical history that includes  section; Foot surgery (Right, 2010); back surgery (); Tonsillectomy; Abdomen surgery; Breast surgery (); Hemorrhoid surgery (); Cystoscopy (2017); cysto/uretero/pyeloscopy, calculus tx (N/A, 2017); Colposcopy (2019);  Dilation and INITIAL VITALS:   BP (!) 142/82   Pulse 85   Temp 98.5 °F (36.9 °C)   Resp 22   Ht 5' 5\" (1.651 m)   Wt 228 lb (103.4 kg)   LMP 04/29/2019 (Exact Date)   SpO2 97%   BMI 37.94 kg/m²     Physical Exam     Gen. Appearance: patient appears well, nondistressed. HEENT: head atraumatic, normocephalic. Pupils equal and reactive to light. Oropharynx clear and moist.  Neck: Supple, normal range of motion. No lymphadenopathy. Chest: No chest wall tenderness   Pulmonary: Mildly increased respiratory effort. Lungs clear to auscultation bilaterally. Equal air entry right left. Cardiovascular:  Heart sounds normal, no murmurs. Peripheral pulses strong, regular, equal.   Abdomen: Soft, nontender, nondistended. Bowel sounds normal. No palpable masses.    Neurology: GCS 15.  Oriented to person place and time.  Normal tone and power in all 4 extremities.  No sensory deficits.    Skin: Warm, dry, well perfused    DIFFERENTIAL  DIAGNOSIS     PLAN (Vena Milo / Angeles Emperor / EKG):  Orders Placed This Encounter   Procedures    XR CHEST STANDARD (2 VW)    CT Chest Pulmonary Embolism W Contrast    CBC Auto Differential    Comprehensive Metabolic Panel    D-Dimer, Quantitative    Troponin    HCG Qualitative, Serum    EKG 12 Lead       MEDICATIONS ORDERED:  Orders Placed This Encounter   Medications    ioversol (OPTIRAY) 74 % injection 75 mL    0.9 % sodium chloride bolus    sodium chloride flush 0.9 % injection 10 mL    ibuprofen (ADVIL;MOTRIN) 800 MG tablet     Sig: Take 1 tablet by mouth every 8 hours as needed for Pain     Dispense:  30 tablet     Refill:  0    acetaminophen (TYLENOL) 500 MG tablet     Sig: Take 2 tablets by mouth 3 times daily as needed for Pain     Dispense:  180 tablet     Refill:  0       DDX: Emergent: ACS/NSTEMI/STEMI/angina, arrhythmia, trauma, aortic dissection,  PE, PNA, pneumothroax, esophageal rupture, tamponade, Cocaine use  Nonemergent: pneumonia, pericarditis, GERD, MSK, Endocarditis, anxiety      DIAGNOSTIC RESULTS / EMERGENCY DEPARTMENT COURSE / MDM     LABS:  Results for orders placed or performed during the hospital encounter of 05/23/19   CBC Auto Differential   Result Value Ref Range    WBC 9.5 3.5 - 11.0 k/uL    RBC 4.61 4.0 - 5.2 m/uL    Hemoglobin 13.6 12.0 - 16.0 g/dL    Hematocrit 40.3 36 - 46 %    MCV 87.4 80 - 100 fL    MCH 29.5 26 - 34 pg    MCHC 33.8 31 - 37 g/dL    RDW 13.3 11.5 - 14.9 %    Platelets 273 804 - 591 k/uL    MPV 8.8 6.0 - 12.0 fL    NRBC Automated NOT REPORTED per 100 WBC    Differential Type NOT REPORTED     Seg Neutrophils 63 36 - 66 %    Lymphocytes 30 24 - 44 %    Monocytes 6 1 - 7 %    Eosinophils % 1 0 - 4 %    Basophils 0 0 - 2 %    Immature Granulocytes NOT REPORTED 0 %    Segs Absolute 5.90 1.3 - 9.1 k/uL    Absolute Lymph # 2.90 1.0 - 4.8 k/uL    Absolute Mono # 0.60 0.1 - 1.3 k/uL    Absolute Eos # 0.10 0.0 - 0.4 k/uL    Basophils # 0.00 0.0 - 0.2 k/uL    Absolute Immature Granulocyte NOT REPORTED 0.00 - 0.30 k/uL    WBC Morphology NOT REPORTED     RBC Morphology NOT REPORTED     Platelet Estimate NOT REPORTED    Comprehensive Metabolic Panel   Result Value Ref Range    Glucose 126 (H) 70 - 99 mg/dL    BUN 17 6 - 20 mg/dL    CREATININE 0.74 0.50 - 0.90 mg/dL    Bun/Cre Ratio NOT REPORTED 9 - 20    Calcium 9.3 8.6 - 10.4 mg/dL    Sodium 141 135 - 144 mmol/L    Potassium 4.1 3.7 - 5.3 mmol/L    Chloride 107 98 - 107 mmol/L    CO2 24 20 - 31 mmol/L    Anion Gap 10 9 - 17 mmol/L    Alkaline Phosphatase 132 (H) 35 - 104 U/L    ALT 44 (H) 5 - 33 U/L    AST 24 <32 U/L    Total Bilirubin <0.15 (L) 0.3 - 1.2 mg/dL    Total Protein 7.2 6.4 - 8.3 g/dL    Alb 4.2 3.5 - 5.2 g/dL    Albumin/Globulin Ratio NOT REPORTED 1.0 - 2.5    GFR Non-African American >60 >60 mL/min    GFR African American >60 >60 mL/min    GFR Comment          GFR Staging NOT REPORTED    D-Dimer, Quantitative   Result Value Ref Range    D-Dimer, Quant 0.89 (H) 0.00 - 0.59 mg/L FEU   Troponin Result Value Ref Range    Troponin, High Sensitivity <6 0 - 14 ng/L    Troponin T NOT REPORTED <0.03 ng/mL    Troponin Interp NOT REPORTED    HCG Qualitative, Serum   Result Value Ref Range    hCG Qual NEGATIVE NEGATIVE       IMPRESSION: 37year old patient presents with right-sided chest pain and shortness of breath. Patient is afebrile, hemodynamically stable, and in no acute distress. Appropriately interactive and cooperative with interview and examination. Mildly increased respiratory effort however she is speaking in full sentences and has normal SPO2 on room air. Lungs clear bilaterally, heart sounds normal, abdomen benign, neurologically normal.  Patient is tachycardic. Symptoms, recent history of surgery, and vital signs concerning for possible pulmonary embolism. Moderate pretest probability for pulmonary embolism. Plan for d-dimer, CT chest if indicated. We'll also obtain cardiac workup. No infectious symptoms evaluate for pneumonia with chest x-ray. Plan for CBC, CMP, d-dimer, hCG, troponin, EKG, chest x-ray. Reassess. RADIOLOGY:  CT Chest Pulmonary Embolism W Contrast (Preliminary result)   Result time 05/23/19 23:31:59   Preliminary result by Tiana Figueredo MD (05/23/19 23:31:59)                Impression:    1. No pulmonary embolism or parenchymal lung infiltrate. 2. Benign right adrenal adenoma.                    XR CHEST STANDARD (2 VW) (Final result)   Result time 05/23/19 21:36:01   Final result by Bella Don MD (05/23/19 21:36:01)                Impression:    No acute abnormality.             Narrative:    EXAMINATION:  TWO XRAY VIEWS OF THE CHEST    5/23/2019 9:10 pm    COMPARISON:  May 8    HISTORY:  ORDERING SYSTEM PROVIDED HISTORY: right sided chest pain  TECHNOLOGIST PROVIDED HISTORY:  right sided chest pain  Ordering Physician Provided Reason for Exam: chest pain  Acuity: Acute  Type of Exam: Initial  Additional signs and symptoms: Right side chest discomfort, shoulder pain,  hurts to cough, congestion.  Pt states laparoscopic procedure done a week ago. Relevant Medical/Surgical History: Right side chest discomfort, shoulder  pain, hurts to cough, congestion.  Pt states laparoscopic procedure done a  week ago. FINDINGS:  Heart size and mediastinal contours are stable.  There is no lung  consolidation.  There is no effusion or pneumothorax.  There is no displaced  rib fracture                  EKG  EKG Interpretation    Interpreted by me    Rhythm: normal sinus   Rate: 92  Axis: normal  Ectopy: none  Conduction: normal  ST Segments: no acute change  T Waves: no acute change  Q Waves: none    Clinical Impression: no acute changes     All EKG's are interpreted by the Emergency Department Physician who either signs or Co-signs this chart in the absence of a cardiologist.    EMERGENCY DEPARTMENT COURSE:    Laboratory investigations unremarkable aside from elevated d-dimer. CT chest obtained; no pulmonary embolism or any other abnormality. Patient reassessed. Her vital signs have normalized. She appears quite comfortable. Results discussed with her. I counseled her to use Tylenol and ibuprofen for her pain. Patient was counseled to follow up with their Primary Care Provider as soon as possible for an ER follow-up visit. Patient counseled to return to the Emergency Department for any worsening symptoms or for any other cares or concerns. Patient verbalized understanding and agreement with plan. Discharged to home in stable condition and in no distress. PROCEDURES:  None    CONSULTS:  None    CRITICAL CARE:  None    FINAL IMPRESSION      1.  Chest pain, unspecified type          DISPOSITION / PLAN     DISPOSITION Decision To Discharge 05/23/2019 11:52:52 PM      PATIENT REFERRED TO:  Angela Mendoza, ANGELA - CNP  [address] [facility]  [address] [place] [address],[address]  [address] [place] [address]  [phone]    Schedule an appointment as soon as possible for a visit       [facility] ED  [address]  [phone]    As needed      DISCHARGE MEDICATIONS:  Discharge Medication List as of 5/23/2019 11:54 PM      START taking these medications    Details   ibuprofen (ADVIL;MOTRIN) 800 MG tablet Take 1 tablet by mouth every 8 hours as needed for Pain, Disp-30 tablet, R-0Print      acetaminophen (TYLENOL) 500 MG tablet Take 2 tablets by mouth 3 times daily as needed for Pain, Disp-180 tablet, R-0Print             Anayeli Kumar MD  Emergency Medicine Resident    (Please note that portions of thisnote were completed with a voice recognition program.  Efforts were made to edit the dictations but occasionally words are mis-transcribed.)        Anayeli Kumar MD  05/24/19 1627

## 2019-05-30 ENCOUNTER — OFFICE VISIT (OUTPATIENT)
Dept: OBGYN CLINIC | Age: 44
End: 2019-05-30

## 2019-05-30 VITALS
HEART RATE: 78 BPM | SYSTOLIC BLOOD PRESSURE: 130 MMHG | WEIGHT: 233 LBS | DIASTOLIC BLOOD PRESSURE: 84 MMHG | HEIGHT: 65 IN | BODY MASS INDEX: 38.82 KG/M2

## 2019-05-30 DIAGNOSIS — Z98.890 HX OF ABDOMINOPLASTY: ICD-10-CM

## 2019-05-30 DIAGNOSIS — N85.2 ENLARGED UTERUS: ICD-10-CM

## 2019-05-30 DIAGNOSIS — N85.2 BULKY OR ENLARGED UTERUS: ICD-10-CM

## 2019-05-30 DIAGNOSIS — Z98.890 H/O LAPAROSCOPY: ICD-10-CM

## 2019-05-30 DIAGNOSIS — N80.30 ENDOMETRIOSIS OF PELVIC PERITONEUM: ICD-10-CM

## 2019-05-30 DIAGNOSIS — N94.6 DYSMENORRHEA: ICD-10-CM

## 2019-05-30 DIAGNOSIS — N92.0 MENORRHAGIA WITH REGULAR CYCLE: Primary | ICD-10-CM

## 2019-05-30 DIAGNOSIS — Z98.891 HX OF CESAREAN SECTION: ICD-10-CM

## 2019-05-30 LAB
EKG ATRIAL RATE: 66 BPM
EKG ATRIAL RATE: 92 BPM
EKG P AXIS: 37 DEGREES
EKG P AXIS: 58 DEGREES
EKG P-R INTERVAL: 124 MS
EKG P-R INTERVAL: 132 MS
EKG Q-T INTERVAL: 346 MS
EKG Q-T INTERVAL: 396 MS
EKG QRS DURATION: 74 MS
EKG QRS DURATION: 84 MS
EKG QTC CALCULATION (BAZETT): 415 MS
EKG QTC CALCULATION (BAZETT): 427 MS
EKG R AXIS: 41 DEGREES
EKG R AXIS: 60 DEGREES
EKG T AXIS: 46 DEGREES
EKG T AXIS: 52 DEGREES
EKG VENTRICULAR RATE: 66 BPM
EKG VENTRICULAR RATE: 92 BPM

## 2019-05-30 PROCEDURE — 99024 POSTOP FOLLOW-UP VISIT: CPT | Performed by: OBSTETRICS & GYNECOLOGY

## 2019-05-30 NOTE — PROGRESS NOTES
Josesito Barry  2019  12:58 PM      Josesito Barry  Procedure:   PROCEDURE DATE: 5/15/2019      Pre-op Diagnosis: ENLARGED UTERUS 13 CM  HEAVY MENSES  DYSMENORRHEA ADL'S AFFECTED  HX ABDOMINOPLASTY HX  and a Medically Indicated Tubal Interruption PLANNED     Post-op Diagnosis: Same; ENDOMETRIOSIS 3-4; SEVERE PELVIC ADHESIVE DZ     Procedure: Novasure Hysteroscopic Endometrial Ablation (FAILED CO2 TEST) and Diagnostic Laparoscopy; Tubal interruption not completed due to severe adhesive dz and inability to access fallopian tiubes     Surgeon: Noel Huizar DO          Josesito Barry is a 37 y.o. female [id number]      The patient was seen, she denies any complaints. She denied any shortness of breath, chest pain or dizziness. She denied any nausea, vomiting, or diarrhea. There is no fever, chills, or rigors. The patient denies any vaginal bleeding, discharge or odor. All of her pre-operative complaints are now resolved. Blood pressure 130/84, pulse 78, height 5' 5\" (1.651 m), weight 233 lb (105.7 kg), last menstrual period 2019, not currently breastfeeding. Abdominal Exam: soft non-tender. Good bowel sounds. No guarding, rebound or rigidity. No costal vertebral angle tenderness bilateral. No hernias    Incision: healing well, no drainage, no erythema, no hernia, no seroma, no swelling, well approximated    Extremities: No edema or calf pain noted bilaterally.     Pelvic Exam: Declined      Results for orders placed or performed during the hospital encounter of 19   CBC Auto Differential   Result Value Ref Range    WBC 9.5 3.5 - 11.0 k/uL    RBC 4.61 4.0 - 5.2 m/uL    Hemoglobin 13.6 12.0 - 16.0 g/dL    Hematocrit 40.3 36 - 46 %    MCV 87.4 80 - 100 fL    MCH 29.5 26 - 34 pg    MCHC 33.8 31 - 37 g/dL    RDW 13.3 11.5 - 14.9 %    Platelets 913 662 - 111 k/uL    MPV 8.8 6.0 - 12.0 fL    NRBC Automated NOT REPORTED per 100 WBC    Differential Type NOT REPORTED     Seg Neutrophils 63 of  section     4. Hx of abdominoplasty     5. Bulky or enlarged uterus     6. Dysmenorrhea     7. H/O laparoscopy failed Ablation Severe Adhesive Disease 5/15/2019     8. Endometriosis of pelvic peritoneum Stage 3-4          Patient Active Problem List    Diagnosis Date Noted    H/O laparoscopy failed Ablation Severe Adhesive Disease 5/15/2019 2019     Priority: High     If HYST:  Pfannenstiel  Bowel Prep and stents  FOI JERICA   Uterus scarred to Anterior abdominal wall with colon to RLQ      Endometriosis of pelvic peritoneum Stage 3-4 2019     Priority: High    Dysmenorrhea 2019     Priority: High    Menorrhagia with regular cycle 10/15/2018     Priority: High    Essential hypertension 2014     Priority: High    Hx of  section x 1 LTC 2019     Priority: Medium    Hx of abdominoplasty 2019     Priority: Medium    Smoker 10/15/2018     Priority: Medium    Homozygous MTHFR mutation 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 ([facility] 10/18/2017     Priority: Medium    Family history of factor V Leiden mutation (daughter) Pt is negative 10/02/2017     Priority: Medium    Endometriosis of peritoneum     Pelvic adhesive disease     Chronic cough 10/15/2018    Chronic fatigue 10/15/2018    Microscopic hematuria 2018    Acute pain of right knee 10/23/2017    Right forearm pain 10/23/2017    History of migraine headaches     Mixed hyperlipidemia     Class 2 severe obesity due to excess calories with serious comorbidity and body mass index (BMI) of 39.0 to 39.9 in adult ([facility] 2015         POD# 2 weeks  Procedure:   PROCEDURE DATE: 5/15/2019   Procedure: Novasure Hysteroscopic Endometrial Ablation (FAILED CO2 TEST) and Diagnostic Laparoscopy; Tubal interruption not completed due to severe adhesive dz and inability to access fallopian tiubes    Stable      Plan:   Return in about 1 month (around 2019) for Surgical Boarding, FU Labs/Diagnostics and Care Plan.     Return to office 4-6 weeks    If she elects hysterectomy then an Open technique pfannenstiel entry. Bowel prep and ureteral stents are indicated. (SANTOS-USO or BSO) +/- Enterocele with extensive JERICA. Fulguration of endometriosis. Possible suppressive therapy if the pt elects to leave an Ovary. Risk of second surgery if ovary is left intact.

## 2019-06-10 ENCOUNTER — TELEPHONE (OUTPATIENT)
Dept: OBGYN CLINIC | Age: 44
End: 2019-06-10

## 2019-06-11 ENCOUNTER — TELEPHONE (OUTPATIENT)
Dept: OBGYN CLINIC | Age: 44
End: 2019-06-11

## 2019-06-11 RX ORDER — MEDROXYPROGESTERONE ACETATE 10 MG/1
10 TABLET ORAL DAILY
Qty: 7 TABLET | Refills: 0 | Status: SHIPPED | OUTPATIENT
Start: 2019-06-11 | End: 2019-09-26

## 2019-06-18 ENCOUNTER — TELEPHONE (OUTPATIENT)
Dept: OBGYN CLINIC | Age: 44
End: 2019-06-18

## 2019-06-18 NOTE — TELEPHONE ENCOUNTER
Patient called office reporting that she has been spotting since starting depo injections on May 22, 2019, which was her first administration. Patient denied heavy bleeding at this time. Per patient's request, spoke with Ankita Gross to determine if there was any intervention to assist in stopping the bleeding. Per Vimal Whatley, spotting can be expected after starting depo, and no treatment is indicated at this time. Patient notified of recommendations. Patient instructed to monitor bleeding and to call office if bleeding becomes heavier or problems persist/arise. Patient verbalized an understanding of plan of care.

## 2019-12-19 ENCOUNTER — HOSPITAL ENCOUNTER (OUTPATIENT)
Dept: WOMENS IMAGING | Age: 44
Discharge: HOME | End: 2019-12-21
Payer: COMMERCIAL

## 2019-12-19 DIAGNOSIS — Z12.39 BREAST CANCER SCREENING: ICD-10-CM

## 2019-12-19 PROCEDURE — 77063 BREAST TOMOSYNTHESIS BI: CPT

## 2021-04-23 ENCOUNTER — HOSPITAL ENCOUNTER (OUTPATIENT)
Age: 46
Setting detail: SPECIMEN
Discharge: HOME | End: 2021-04-23
Payer: COMMERCIAL

## 2021-04-24 LAB
DIRECT EXAM: NORMAL
Lab: NORMAL
SPECIMEN DESCRIPTION: NORMAL

## 2021-08-09 ENCOUNTER — HOSPITAL ENCOUNTER (OUTPATIENT)
Dept: WOMENS IMAGING | Age: 46
Discharge: HOME | End: 2021-08-11
Payer: COMMERCIAL

## 2021-08-09 DIAGNOSIS — Z12.31 ENCOUNTER FOR SCREENING MAMMOGRAM FOR MALIGNANT NEOPLASM OF BREAST: ICD-10-CM

## 2021-08-09 PROCEDURE — 77063 BREAST TOMOSYNTHESIS BI: CPT

## 2022-06-22 ENCOUNTER — NURSE TRIAGE (OUTPATIENT)
Dept: OTHER | Facility: CLINIC | Age: 47
End: 2022-06-22

## 2022-06-22 NOTE — TELEPHONE ENCOUNTER
Please have her increase her metoprolol to 50 mg twice a day. It appears she is taking 3 a day right now. I would like her to take for total or 100 mg total daily. Continue with the losartan 100 mg daily as well. Avoid excessive amounts of caffeine in the diet. Reduce stress level. Reduce salt in the diet. Make sure drinking plenty of fluids. Continue monitoring blood pressure and bring those readings in with her at her follow-up appointment. She can do a virtual visit tomorrow morning. Or she can be seen in person or virtual on Monday as well.

## 2022-06-22 NOTE — TELEPHONE ENCOUNTER
Received call from Tyshawn Silva at [facility] with [employer]. Subjective: Caller states \"High blood pressure\"     Current Symptoms:   Patient reports at a health screening 2 weeks ago her BP readings were 176/112, 180/120, and 184/124. Blood pressure this morning was 159/101  Hx of HTN and takes Losartan and Metoprolol. Denies missing any recent doses. Constant HA x 1 week  Heartburn after eating and drinking \"anything\" x 1 week    Onset: 2 weeks ago    Pain Severity: 6/10; Constant- headache    Temperature: Denies    What has been tried: Advil, Tylenol    LMP: NA Pregnant: No    Recommended disposition: See PCP within 3 Days    Care advice provided, patient verbalizes understanding; denies any other questions or concerns; instructed to call back for any new or worsening symptoms. Patient/Caller agrees with recommended disposition; writer provided warm transfer to Zohra Lopez at [facility] for appointment scheduling     Attention Provider: Thank you for allowing me to participate in the care of your patient. The patient was connected to triage in response to information provided to the ECC/PSC. Please do not respond through this encounter as the response is not directed to a shared pool.     Reason for Disposition   Systolic BP >= 250 OR Diastolic >= 973    Protocols used: BLOOD PRESSURE - HIGH-ADULT-OH

## 2024-01-19 NOTE — PROGRESS NOTES
GYN Resident Progress Note     Patient may be discharged home once she has met criteria in the PACU. Please call or page OB/GYN resident with any questions, concerns, or if patient has not met PACU discharge criteria in 2-3 hours.  .  Please page first.       Mara Slater  OB/GYN Resident, PGY2  Pager: [phone] [facility]  05/15/19  9:31 AM
Urine pregnancy negative
What Type Of Note Output Would You Prefer (Optional)?: Standard Output
What Is The Reason For Today's Visit?: Full Body Skin Examination
What Is The Reason For Today's Visit? (Being Monitored For X): concerning skin lesions on an annual basis

## (undated) DEVICE — TROCAR ENDOSCP L100MM DIA5MM BLDELSS STBL SL THRD OPT VW

## (undated) DEVICE — GARMENT COMPR STD FOR 17IN CALF UNIF THER FLOTRN

## (undated) DEVICE — ST CHARLES GYN LAPAROSCOPY PK: Brand: MEDLINE INDUSTRIES, INC.

## (undated) DEVICE — GLOVE SURG SZ 8 L12IN FNGR THK75MIL WHT LTX POLYMER BEAD

## (undated) DEVICE — SINGLE PORT MANIFOLD: Brand: NEPTUNE 2

## (undated) DEVICE — 20 ML SYRINGE LUER-LOCK TIP: Brand: MONOJECT

## (undated) DEVICE — Device: Brand: NAKAO QUICKTRAP

## (undated) DEVICE — PACK PROCEDURE SURG CYSTO SVMMC LF

## (undated) DEVICE — 3M™ STERI-STRIP™ WOUND CLOSURE SYSTEMS 5 EACH/PACK 25 PACKS/CARTON 4 CARTONS/CASE W8516: Brand: 3M™ STERI-STRIP™

## (undated) DEVICE — 3M™ WARMING BLANKET, UPPER BODY, 10 PER CASE, 42268: Brand: BAIR HUGGER™

## (undated) DEVICE — ADAPTER URO SCP UROLOK LL

## (undated) DEVICE — ST CHARLES PERI-GYN PACK: Brand: MEDLINE INDUSTRIES, INC.

## (undated) DEVICE — SUTURE MCRYL + SZ 4-0 L27IN ABSRB UD L19MM PS-2 3/8 CIR MCP426H

## (undated) DEVICE — 3M™ STERI-STRIP™ COMPOUND BENZOIN TINCTURE 40 BAGS/CARTON 4 CARTONS/CASE C1544: Brand: 3M™ STERI-STRIP™

## (undated) DEVICE — KIT THERMOABLATION 6MM ENDOMET DEV NOVASURE

## (undated) DEVICE — SINGLE ACTION PUMPING SYSTEM

## (undated) DEVICE — CYSTO/BLADDER IRRIGATION SET, REGULATING CLAMP

## (undated) DEVICE — TROCARS: Brand: KII® BLUNT TIP ACCESS SYSTEM

## (undated) DEVICE — GLOVE ORANGE PI 8   MSG9080

## (undated) DEVICE — SUTURE VCRL + SZ 0 L27IN ABSRB VLT L26MM UR-6 5/8 CIR VCP603H

## (undated) DEVICE — STRAP,POSITIONING,KNEE/BODY,FOAM,4X60": Brand: MEDLINE

## (undated) DEVICE — GEL US 20GM NONIRRITATING OVERWRAPPED FILE PCH TRNSMIT

## (undated) DEVICE — TOTAL TRAY, DB, 100% SILI FOLEY, 16FR 10: Brand: MEDLINE

## (undated) DEVICE — 3M™ STERI-STRIP™ REINFORCED ADHESIVE SKIN CLOSURES, R1547, 1/2 IN X 4 IN (12 MM X 100 MM), 6 STRIPS/ENVELOPE: Brand: 3M™ STERI-STRIP™

## (undated) DEVICE — GUIDEWIRE URO L150CM DIA0.035IN STIFF NIT HYDRPHLC STR TIP

## (undated) DEVICE — SOLUTION ANTIFOG VIS SYS CLEARIFY LAPSCP

## (undated) DEVICE — POUCH STRL SELF-SEAL 7.5X13IN

## (undated) DEVICE — TROCAR ENDOSCP L100MM DIA5MM BLDELSS STBL SL OBT RADLUC

## (undated) DEVICE — FIBER LSR DIA200UM FLEXSHIELD COAT HOLM HI PWR TRAC TIP

## (undated) DEVICE — SET ENDOSCP SEAL HYSTEROSCOPE RIG OUTFLO CHN DISP MYOSURE

## (undated) DEVICE — DUAL LUMEN URETERAL CATHETER

## (undated) DEVICE — GOWN,SIRUS,NONRNF,SETINSLV,XL,20/CS: Brand: MEDLINE

## (undated) DEVICE — ELECTROSURGICAL PENCIL BUTTON SWITCH E-Z CLEAN COATED BLADE ELECTRODE 10 FT (3 M) CORD HOLSTER: Brand: MEGADYNE